# Patient Record
Sex: FEMALE | Race: WHITE | NOT HISPANIC OR LATINO | Employment: OTHER | URBAN - METROPOLITAN AREA
[De-identification: names, ages, dates, MRNs, and addresses within clinical notes are randomized per-mention and may not be internally consistent; named-entity substitution may affect disease eponyms.]

---

## 2023-10-20 PROBLEM — M62.81 MUSCLE WEAKNESS: Status: ACTIVE | Noted: 2023-10-20

## 2023-10-20 PROBLEM — K21.9 GASTROESOPHAGEAL REFLUX DISEASE WITHOUT ESOPHAGITIS: Status: ACTIVE | Noted: 2023-10-20

## 2023-10-20 PROBLEM — K64.9 HEMORRHOIDS: Status: ACTIVE | Noted: 2023-10-20

## 2023-10-20 PROBLEM — R04.2 BLOOD-TINGED SPUTUM: Status: ACTIVE | Noted: 2023-10-20

## 2023-10-20 PROBLEM — B96.89 BACTERIAL VAGINOSIS: Status: ACTIVE | Noted: 2023-10-20

## 2023-10-20 PROBLEM — N76.0 BACTERIAL VAGINOSIS: Status: ACTIVE | Noted: 2023-10-20

## 2023-10-20 PROBLEM — R30.0 DYSURIA: Status: ACTIVE | Noted: 2023-10-20

## 2023-10-20 PROBLEM — N30.01 ACUTE CYSTITIS WITH HEMATURIA: Status: ACTIVE | Noted: 2023-10-20

## 2023-10-20 PROBLEM — J20.9 ACUTE BRONCHITIS: Status: ACTIVE | Noted: 2023-10-20

## 2023-10-20 PROBLEM — R10.2 PELVIC PAIN: Status: ACTIVE | Noted: 2023-10-20

## 2023-10-20 PROBLEM — J06.9 VIRAL URI WITH COUGH: Status: ACTIVE | Noted: 2023-10-20

## 2023-10-20 PROBLEM — R53.83 FATIGUE: Status: ACTIVE | Noted: 2023-10-20

## 2023-10-20 PROBLEM — F12.10 CANNABIS ABUSE, UNCOMPLICATED: Status: ACTIVE | Noted: 2023-10-20

## 2023-10-20 PROBLEM — Z34.82 MULTIGRAVIDA IN SECOND TRIMESTER (HHS-HCC): Status: ACTIVE | Noted: 2023-10-20

## 2023-10-20 PROBLEM — N89.8 VAGINAL DISCHARGE: Status: ACTIVE | Noted: 2023-10-20

## 2023-10-20 RX ORDER — DULOXETIN HYDROCHLORIDE 30 MG/1
1 CAPSULE, DELAYED RELEASE ORAL NIGHTLY
COMMUNITY
Start: 2016-11-09 | End: 2023-10-23 | Stop reason: WASHOUT

## 2023-10-20 RX ORDER — VALACYCLOVIR HYDROCHLORIDE 1 G/1
TABLET, FILM COATED ORAL
COMMUNITY
End: 2024-04-11 | Stop reason: ALTCHOICE

## 2023-10-20 RX ORDER — ACETAMINOPHEN 500 MG
1000 TABLET ORAL EVERY 6 HOURS
COMMUNITY
Start: 2021-04-26 | End: 2023-10-23 | Stop reason: WASHOUT

## 2023-10-20 RX ORDER — FLUCONAZOLE 150 MG/1
TABLET ORAL
COMMUNITY
Start: 2022-05-13 | End: 2023-10-23 | Stop reason: WASHOUT

## 2023-10-20 RX ORDER — COPPER 313.4 MG/1
INTRAUTERINE DEVICE INTRAUTERINE
COMMUNITY

## 2023-10-20 RX ORDER — CETIRIZINE HYDROCHLORIDE 10 MG/1
TABLET ORAL DAILY
COMMUNITY
Start: 2014-01-21 | End: 2023-10-23 | Stop reason: WASHOUT

## 2023-10-20 RX ORDER — IBUPROFEN 600 MG/1
600 TABLET ORAL EVERY 6 HOURS PRN
COMMUNITY
Start: 2021-04-26 | End: 2023-10-23 | Stop reason: WASHOUT

## 2023-10-20 RX ORDER — VALACYCLOVIR HYDROCHLORIDE 500 MG/1
1 TABLET, FILM COATED ORAL DAILY
COMMUNITY
End: 2024-04-11 | Stop reason: ALTCHOICE

## 2023-10-23 ENCOUNTER — OFFICE VISIT (OUTPATIENT)
Dept: OBSTETRICS AND GYNECOLOGY | Facility: CLINIC | Age: 28
End: 2023-10-23
Payer: COMMERCIAL

## 2023-10-23 VITALS
SYSTOLIC BLOOD PRESSURE: 102 MMHG | HEIGHT: 63 IN | WEIGHT: 112 LBS | BODY MASS INDEX: 19.84 KG/M2 | DIASTOLIC BLOOD PRESSURE: 62 MMHG

## 2023-10-23 DIAGNOSIS — R10.2 PELVIC PAIN: Primary | ICD-10-CM

## 2023-10-23 DIAGNOSIS — N89.8 VAGINAL DISCHARGE: ICD-10-CM

## 2023-10-23 PROCEDURE — 87661 TRICHOMONAS VAGINALIS AMPLIF: CPT

## 2023-10-23 PROCEDURE — 1036F TOBACCO NON-USER: CPT | Performed by: NURSE PRACTITIONER

## 2023-10-23 PROCEDURE — 87205 SMEAR GRAM STAIN: CPT

## 2023-10-23 PROCEDURE — 99214 OFFICE O/P EST MOD 30 MIN: CPT | Performed by: NURSE PRACTITIONER

## 2023-10-23 PROCEDURE — 87491 CHLMYD TRACH DNA AMP PROBE: CPT

## 2023-10-23 PROCEDURE — 87591 N.GONORRHOEAE DNA AMP PROB: CPT

## 2023-10-23 ASSESSMENT — ENCOUNTER SYMPTOMS
CONSTITUTIONAL NEGATIVE: 1
RESPIRATORY NEGATIVE: 1
GASTROINTESTINAL NEGATIVE: 1
PSYCHIATRIC NEGATIVE: 1
NEUROLOGICAL NEGATIVE: 1

## 2023-10-23 NOTE — PROGRESS NOTES
"Subjective   Patient ID: Kristina Manning is a 28 y.o. female who presents for Vaginal Bleeding (Patient complains of \"intense cramping\" outside of her periods, lower back pain and bloody, mucus discharge./Reviewing EMR indicates normal PAP 6/9/21. Paragard was inserted 7/13/2021).  28 year old here for complaints of having pelvic pain and bloody vaginal discharge.  She first had pelvic pain start months ago.  She notes that the pain is off and on.  The pain can be daily and sometimes it will last a few minutes and other times it will last hours.  The pain is not associated with her period, but feels like period cramps.  She notes that she thought this may be related to dehydration for a while and she ignored it, but then this month she started to have bloody vaginal discharge and became concerned.  The vaginal discharge is off and on.  She denies any urinary changes with the discharge.  She does desire std testing.     Vaginal Bleeding  The patient's primary symptoms include pelvic pain and vaginal discharge.       Review of Systems   Constitutional: Negative.    HENT: Negative.     Respiratory: Negative.     Gastrointestinal: Negative.    Genitourinary:  Positive for pelvic pain, vaginal bleeding and vaginal discharge.   Skin: Negative.    Neurological: Negative.    Psychiatric/Behavioral: Negative.         Objective   Physical Exam  Vitals reviewed.   Constitutional:       Appearance: Normal appearance. She is well-developed.   Pulmonary:      Effort: Pulmonary effort is normal. No respiratory distress.   Chest:   Breasts:     Breasts are symmetrical.      Right: Normal. No swelling, bleeding, inverted nipple, mass, nipple discharge, skin change or tenderness.      Left: Normal. No swelling, bleeding, inverted nipple, mass, nipple discharge, skin change or tenderness.   Abdominal:      Palpations: Abdomen is soft.   Genitourinary:     General: Normal vulva.      Exam position: Lithotomy position.      Pubic " Area: No rash.       Labia:         Right: No rash, tenderness, lesion or injury.         Left: No rash, tenderness, lesion or injury.       Urethra: No prolapse, urethral pain, urethral swelling or urethral lesion.      Vagina: Vaginal discharge present.      Cervix: Normal.      Uterus: Normal. Tender.       Adnexa: Right adnexa normal and left adnexa normal.      Rectum: Normal.   Musculoskeletal:         General: Normal range of motion.   Lymphadenopathy:      Upper Body:      Right upper body: No supraclavicular, axillary or pectoral adenopathy.      Left upper body: No supraclavicular, axillary or pectoral adenopathy.   Skin:     General: Skin is warm and dry.   Neurological:      General: No focal deficit present.      Mental Status: She is alert and oriented to person, place, and time. Mental status is at baseline.   Psychiatric:         Attention and Perception: Attention and perception normal.         Mood and Affect: Mood and affect normal.         Speech: Speech normal.         Behavior: Behavior normal. Behavior is cooperative.         Thought Content: Thought content normal.         Judgment: Judgment normal.         Assessment/Plan   Problem List Items Addressed This Visit             ICD-10-CM    Pelvic pain - Primary R10.2    Relevant Orders    US PELVIS TRANSABDOMINAL WITH TRANSVAGINAL    Vaginal discharge N89.8     Gram stain sent  Gc/chlamydia/trich sent  Will treat pending results if needed  If normal can refer to GI for the pain  Follow up as needed

## 2023-10-24 LAB
C TRACH RRNA SPEC QL NAA+PROBE: NEGATIVE
CLUE CELLS VAG LPF-#/AREA: NORMAL /[LPF]
N GONORRHOEA DNA SPEC QL PROBE+SIG AMP: NEGATIVE
NUGENT SCORE: 1
T VAGINALIS RRNA SPEC QL NAA+PROBE: NEGATIVE
YEAST VAG WET PREP-#/AREA: NORMAL

## 2023-10-27 ENCOUNTER — ANCILLARY PROCEDURE (OUTPATIENT)
Dept: RADIOLOGY | Facility: CLINIC | Age: 28
End: 2023-10-27
Payer: COMMERCIAL

## 2023-10-27 DIAGNOSIS — R10.2 PELVIC PAIN: ICD-10-CM

## 2023-10-27 PROCEDURE — 76830 TRANSVAGINAL US NON-OB: CPT | Performed by: RADIOLOGY

## 2023-10-27 PROCEDURE — 76856 US EXAM PELVIC COMPLETE: CPT | Performed by: RADIOLOGY

## 2023-10-27 PROCEDURE — 76856 US EXAM PELVIC COMPLETE: CPT

## 2023-10-31 ENCOUNTER — TELEPHONE (OUTPATIENT)
Dept: OBSTETRICS AND GYNECOLOGY | Facility: CLINIC | Age: 28
End: 2023-10-31
Payer: COMMERCIAL

## 2023-10-31 ENCOUNTER — HOSPITAL ENCOUNTER (EMERGENCY)
Facility: HOSPITAL | Age: 28
Discharge: HOME | End: 2023-10-31
Attending: EMERGENCY MEDICINE
Payer: COMMERCIAL

## 2023-10-31 ENCOUNTER — ANCILLARY PROCEDURE (OUTPATIENT)
Dept: RADIOLOGY | Facility: HOSPITAL | Age: 28
End: 2023-10-31
Payer: COMMERCIAL

## 2023-10-31 VITALS
HEIGHT: 63 IN | SYSTOLIC BLOOD PRESSURE: 113 MMHG | DIASTOLIC BLOOD PRESSURE: 76 MMHG | HEART RATE: 78 BPM | BODY MASS INDEX: 19.49 KG/M2 | WEIGHT: 110 LBS | OXYGEN SATURATION: 99 % | TEMPERATURE: 97.9 F | RESPIRATION RATE: 18 BRPM

## 2023-10-31 DIAGNOSIS — G62.9 NEUROPATHY: Primary | ICD-10-CM

## 2023-10-31 DIAGNOSIS — E87.6 HYPOKALEMIA: ICD-10-CM

## 2023-10-31 LAB
ALBUMIN SERPL BCP-MCNC: 4.5 G/DL (ref 3.4–5)
ALP SERPL-CCNC: 41 U/L (ref 33–110)
ALT SERPL W P-5'-P-CCNC: 13 U/L (ref 7–45)
ANION GAP SERPL CALC-SCNC: 12 MMOL/L (ref 10–20)
AST SERPL W P-5'-P-CCNC: 17 U/L (ref 9–39)
BASOPHILS # BLD AUTO: 0.03 X10*3/UL (ref 0–0.1)
BASOPHILS NFR BLD AUTO: 0.5 %
BILIRUB SERPL-MCNC: 0.4 MG/DL (ref 0–1.2)
BUN SERPL-MCNC: 13 MG/DL (ref 6–23)
CALCIUM SERPL-MCNC: 9.6 MG/DL (ref 8.6–10.3)
CHLORIDE SERPL-SCNC: 103 MMOL/L (ref 98–107)
CO2 SERPL-SCNC: 25 MMOL/L (ref 21–32)
CREAT SERPL-MCNC: 0.62 MG/DL (ref 0.5–1.05)
EOSINOPHIL # BLD AUTO: 0.04 X10*3/UL (ref 0–0.7)
EOSINOPHIL NFR BLD AUTO: 0.6 %
ERYTHROCYTE [DISTWIDTH] IN BLOOD BY AUTOMATED COUNT: 12.1 % (ref 11.5–14.5)
GFR SERPL CREATININE-BSD FRML MDRD: >90 ML/MIN/1.73M*2
GLUCOSE SERPL-MCNC: 96 MG/DL (ref 74–99)
HCT VFR BLD AUTO: 36.7 % (ref 36–46)
HGB BLD-MCNC: 11.9 G/DL (ref 12–16)
IMM GRANULOCYTES # BLD AUTO: 0.01 X10*3/UL (ref 0–0.7)
IMM GRANULOCYTES NFR BLD AUTO: 0.2 % (ref 0–0.9)
LYMPHOCYTES # BLD AUTO: 2.69 X10*3/UL (ref 1.2–4.8)
LYMPHOCYTES NFR BLD AUTO: 41.1 %
MCH RBC QN AUTO: 29.5 PG (ref 26–34)
MCHC RBC AUTO-ENTMCNC: 32.4 G/DL (ref 32–36)
MCV RBC AUTO: 91 FL (ref 80–100)
MONOCYTES # BLD AUTO: 0.34 X10*3/UL (ref 0.1–1)
MONOCYTES NFR BLD AUTO: 5.2 %
NEUTROPHILS # BLD AUTO: 3.44 X10*3/UL (ref 1.2–7.7)
NEUTROPHILS NFR BLD AUTO: 52.4 %
NRBC BLD-RTO: 0 /100 WBCS (ref 0–0)
PLATELET # BLD AUTO: 311 X10*3/UL (ref 150–450)
PMV BLD AUTO: 9.4 FL (ref 7.5–11.5)
POTASSIUM SERPL-SCNC: 3.3 MMOL/L (ref 3.5–5.3)
PROT SERPL-MCNC: 7.9 G/DL (ref 6.4–8.2)
RBC # BLD AUTO: 4.03 X10*6/UL (ref 4–5.2)
SODIUM SERPL-SCNC: 137 MMOL/L (ref 136–145)
WBC # BLD AUTO: 6.6 X10*3/UL (ref 4.4–11.3)

## 2023-10-31 PROCEDURE — 93971 EXTREMITY STUDY: CPT | Mod: FR

## 2023-10-31 PROCEDURE — 93971 EXTREMITY STUDY: CPT | Mod: FOREIGN READ | Performed by: RADIOLOGY

## 2023-10-31 PROCEDURE — 99284 EMERGENCY DEPT VISIT MOD MDM: CPT | Mod: 25

## 2023-10-31 PROCEDURE — 85025 COMPLETE CBC W/AUTO DIFF WBC: CPT | Performed by: EMERGENCY MEDICINE

## 2023-10-31 PROCEDURE — 2500000004 HC RX 250 GENERAL PHARMACY W/ HCPCS (ALT 636 FOR OP/ED): Performed by: EMERGENCY MEDICINE

## 2023-10-31 PROCEDURE — 80053 COMPREHEN METABOLIC PANEL: CPT | Performed by: EMERGENCY MEDICINE

## 2023-10-31 PROCEDURE — 99283 EMERGENCY DEPT VISIT LOW MDM: CPT | Mod: 25 | Performed by: EMERGENCY MEDICINE

## 2023-10-31 PROCEDURE — 36415 COLL VENOUS BLD VENIPUNCTURE: CPT | Performed by: EMERGENCY MEDICINE

## 2023-10-31 RX ORDER — POTASSIUM CHLORIDE 20 MEQ/1
40 TABLET, EXTENDED RELEASE ORAL ONCE
Status: COMPLETED | OUTPATIENT
Start: 2023-10-31 | End: 2023-10-31

## 2023-10-31 RX ADMIN — POTASSIUM CHLORIDE 40 MEQ: 1500 TABLET, EXTENDED RELEASE ORAL at 23:18

## 2023-10-31 ASSESSMENT — PAIN DESCRIPTION - FREQUENCY: FREQUENCY: CONSTANT/CONTINUOUS

## 2023-10-31 ASSESSMENT — COLUMBIA-SUICIDE SEVERITY RATING SCALE - C-SSRS
6. HAVE YOU EVER DONE ANYTHING, STARTED TO DO ANYTHING, OR PREPARED TO DO ANYTHING TO END YOUR LIFE?: NO
1. IN THE PAST MONTH, HAVE YOU WISHED YOU WERE DEAD OR WISHED YOU COULD GO TO SLEEP AND NOT WAKE UP?: NO
2. HAVE YOU ACTUALLY HAD ANY THOUGHTS OF KILLING YOURSELF?: NO

## 2023-10-31 ASSESSMENT — PAIN DESCRIPTION - ORIENTATION: ORIENTATION: RIGHT

## 2023-10-31 ASSESSMENT — PAIN DESCRIPTION - LOCATION: LOCATION: LEG

## 2023-10-31 ASSESSMENT — PAIN DESCRIPTION - DESCRIPTORS
DESCRIPTORS: STABBING
DESCRIPTORS: BURNING

## 2023-10-31 ASSESSMENT — PAIN - FUNCTIONAL ASSESSMENT: PAIN_FUNCTIONAL_ASSESSMENT: 0-10

## 2023-10-31 ASSESSMENT — PAIN SCALES - GENERAL: PAINLEVEL_OUTOF10: 6

## 2023-10-31 NOTE — TELEPHONE ENCOUNTER
----- Message from MALCOLM Morgan sent at 10/30/2023  2:56 PM EDT -----  Please inform patient that her ultrasound returned showing her right ovarian cyst which normally resolves in 1-2 cycles.

## 2023-11-01 NOTE — ED PROVIDER NOTES
HPI   Chief Complaint   Patient presents with    Leg Pain     Right quad pain started behind the right knee,  Now has a stabbing, burning pain traveling up the leg.  Foot is cool and slow cap refill,n  Started paty 1800.         Patient started having significant pain behind her right leg.  Started just above the knee and moved upward.  Sharp burning type pain.  She denies recent trauma.  Started around 6 PM and has been continuous.  She does not take any pain medicine for it.  She denies any shortness of breath any fever chills cough or cold symptoms.  Never had this before.  She denies possibility of pregnancy.  She says it feels little different than a muscle pain.  She says nothing seems to make it feel better or worse that she is there.  No pain in her back.                          [place] Coma Scale Score: 15                  Patient History   Past Medical History:   Diagnosis Date    Acute pharyngitis, unspecified 2017    Acute viral pharyngitis    Encounter for  screening, unspecified 2021     screening encounter    Encounter for removal of intrauterine contraceptive device 2020    Encounter for removal of intrauterine contraceptive device (IUD)    Encounter for supervision of other normal pregnancy, first trimester 2020    Multigravida in first trimester    Encounter for supervision of other normal pregnancy, second trimester 2020    Multigravida in second trimester    Encounter for supervision of other normal pregnancy, third trimester 2020    Multigravida in third trimester    Maternal care for other known or suspected poor fetal growth, unspecified trimester, fetus 1 2021    SGA (small for gestational age), fetal, affecting care of mother, antepartum, unspecified trimester, fetus 1    Migraine without aura, not intractable, without status migrainosus 2017    Common migraine without aura    Other conditions influencing health status  2022    History of cough    Other infections with a predominantly sexual mode of transmission complicating pregnancy, unspecified trimester 2020    Genital herpes complicating pregnancy    Other specified pregnancy related conditions, second trimester 2020    Back pain during pregnancy in second trimester    Other specified pregnancy related conditions, unspecified trimester 2020    Pelvic pain in antepartum period    Other specified pregnancy related conditions, unspecified trimester 2021    Rh negative, antepartum    Other viral diseases complicating pregnancy, unspecified trimester 2021    COVID-19 affecting pregnancy, antepartum    Personal history of other diseases of the female genital tract 2020    History of amenorrhea    Personal history of other diseases of the musculoskeletal system and connective tissue     History of fibromyalgia    Personal history of other diseases of the respiratory system     History of asthma    Pregnancy with inconclusive fetal viability, not applicable or unspecified 2020    Pregnancy with inconclusive fetal viability    Supervision of pregnancy with other poor reproductive or obstetric history, first trimester 10/01/2020    Current pregnancy in first trimester with history of spontaneous  during prior pregnancy    Supervision of pregnancy with other poor reproductive or obstetric history, second trimester 2021    Current pregnancy in second trimester with history of spontaneous  during prior pregnancy    Supervision of pregnancy with other poor reproductive or obstetric history, third trimester 2021    Current pregnancy in third trimester with history of spontaneous  during prior pregnancy    Unspecified convulsions (CMS/HCC) 2017    Generalized convulsive seizure     History reviewed. No pertinent surgical history.  Family History   Problem Relation Name Age of Onset    Hypertension Mother           Benign    Other (Cardiac Disorder) Mother      Other (Malignant Neoplasm) Mother      Other (Malignant Neoplasm) Other Grandmother     Hypertension Other Grandfather     Other (Cardiac Disorder) Other Grandfather      Social History     Tobacco Use    Smoking status: Never    Smokeless tobacco: Never   Vaping Use    Vaping Use: Never used   Substance Use Topics    Alcohol use: Yes    Drug use: Not Currently     Types: Marijuana       Physical Exam   ED Triage Vitals [10/31/23 2107]   Temp Heart Rate Resp BP   36.6 °C (97.9 °F) 80 20 111/74      SpO2 Temp Source Heart Rate Source Patient Position   100 % Skin Monitor Sitting      BP Location FiO2 (%)     Left arm --       Physical Exam  Vitals reviewed.   Constitutional:       General: She is awake.      Appearance: Normal appearance.   HENT:      Head: Normocephalic.      Nose: Nose normal.   Cardiovascular:      Rate and Rhythm: Normal rate and regular rhythm.   Pulmonary:      Effort: Pulmonary effort is normal.      Breath sounds: Normal breath sounds.   Abdominal:      Palpations: Abdomen is soft.   Musculoskeletal:      Cervical back: Normal range of motion.      Comments: Most the patient's pain is between the right knee and hip posteriorly. No calf tenderness. No cords good ROM.   Skin:     General: Skin is warm.      Capillary Refill: Capillary refill takes less than 2 seconds.   Neurological:      Mental Status: She is alert.      Comments: Decreased sensation on right leg per pt. Nl strength foot and toes.          ED Course & MDM   ED Course as of 10/31/23 2337   Tue Oct 31, 2023   2133 [facility] differential diagnosis with the patient.  Concern for DVT.  No sign of obvious deformity or erythema.  Doubt infectious process.  I offered patient some pain control and she decided she want to hold off for now.  Plan is to work-up with ultrasound and labs. [RZ]   2219 I reexamined the patient 2219 patient is stable still refuses pain medication.  Clinically  I suspect this is neurologic in nature.  Awaiting results. [RZ]   1794 I reviewed the results with the patient ultrasound shows no clots potassium slightly low patient will have potassium repletion and will be discharged home. [RZ]      ED Course User Index  [RZ] Haim Lance MD         Diagnoses as of 10/31/23 2337   Neuropathy   Hypokalemia       Medical Decision Making      Procedure  Procedures     Haim Lance MD  10/31/23 0559       Haim Lance MD  10/31/23 5308

## 2024-01-19 ENCOUNTER — HOSPITAL ENCOUNTER (EMERGENCY)
Facility: HOSPITAL | Age: 29
Discharge: HOME | End: 2024-01-19
Attending: EMERGENCY MEDICINE
Payer: COMMERCIAL

## 2024-01-19 VITALS
WEIGHT: 115 LBS | OXYGEN SATURATION: 97 % | TEMPERATURE: 98.3 F | DIASTOLIC BLOOD PRESSURE: 73 MMHG | HEIGHT: 63 IN | RESPIRATION RATE: 16 BRPM | HEART RATE: 85 BPM | SYSTOLIC BLOOD PRESSURE: 109 MMHG | BODY MASS INDEX: 20.38 KG/M2

## 2024-01-19 DIAGNOSIS — R20.0 PERIORAL NUMBNESS: Primary | ICD-10-CM

## 2024-01-19 DIAGNOSIS — I88.9 SUBMENTAL LYMPHADENITIS: ICD-10-CM

## 2024-01-19 PROCEDURE — 99283 EMERGENCY DEPT VISIT LOW MDM: CPT | Performed by: EMERGENCY MEDICINE

## 2024-01-19 ASSESSMENT — COLUMBIA-SUICIDE SEVERITY RATING SCALE - C-SSRS
1. IN THE PAST MONTH, HAVE YOU WISHED YOU WERE DEAD OR WISHED YOU COULD GO TO SLEEP AND NOT WAKE UP?: NO
6. HAVE YOU EVER DONE ANYTHING, STARTED TO DO ANYTHING, OR PREPARED TO DO ANYTHING TO END YOUR LIFE?: NO
2. HAVE YOU ACTUALLY HAD ANY THOUGHTS OF KILLING YOURSELF?: NO

## 2024-01-19 ASSESSMENT — PAIN SCALES - GENERAL: PAINLEVEL_OUTOF10: 0 - NO PAIN

## 2024-01-19 ASSESSMENT — LIFESTYLE VARIABLES
EVER FELT BAD OR GUILTY ABOUT YOUR DRINKING: NO
HAVE YOU EVER FELT YOU SHOULD CUT DOWN ON YOUR DRINKING: NO
HAVE PEOPLE ANNOYED YOU BY CRITICIZING YOUR DRINKING: NO
EVER HAD A DRINK FIRST THING IN THE MORNING TO STEADY YOUR NERVES TO GET RID OF A HANGOVER: NO
REASON UNABLE TO ASSESS: NO

## 2024-01-19 ASSESSMENT — PAIN - FUNCTIONAL ASSESSMENT: PAIN_FUNCTIONAL_ASSESSMENT: 0-10

## 2024-01-19 NOTE — ED TRIAGE NOTES
Pt presents for all over body numbness. States that she was getting a shoulder massage from her nephew. She states that when she stood up, she felt a ball under her chin that progressed to swelling. She states that since then she has had numbness over her entire body. Pt alert and oriented x's 4. Ambulatory in no acute distress at this time.

## 2024-01-19 NOTE — ED PROVIDER NOTES
HPI   Chief Complaint   Patient presents with    Numbness     All over body       HPI: []  28-year-old female history fibromyalgia comes in with the perioral numbness and pain under the chin.  She states yesterday she was getting a massage in the right parascapular area by her 8-year-old nephew and she developed numbness around her lips and pain under the chin.  She had no numbness of the arms or legs.  She has some tingling of the face.  No headache or vision changes.  No chest pain pressure heaviness cough congestion trouble breathing syncope or near syncope.  She had no neck manipulation done.  Nothing in the C-spine.  The massage close in the right parascapular and right shoulder area nothing close to C-spine.    Past history: Fibromyalgia  Social: Patient denies current tobacco alcohol drug abuse.  REVIEW OF SYSTEMS:    GENERAL.: No weight loss, fatigue, anorexia, insomnia, fever.    EYES: No vision loss, double vision, drainage, eye pain.    ENT: No pharyngitis, dry mouth.  Positive pain under the chin    CARDIOPULMONARY: No chest pain, palpitations, syncope, near syncope. No shortness of breath, cough, hemoptysis.    GI: No abdominal pain, change in bowel habits, melena, hematemesis, hematochezia, nausea, vomiting, diarrhea.    : No discharge, dysuria, frequency, urgency, hematuria.  Neuro: Positive perioral numbness  MS: No limb pain, joint pain, joint swelling.    SKIN: No rashes.    PSYCH: No depression, anxiety, suicidality, homicidality.    Review of systems is otherwise negative unless stated above or in history of present illness.  Social history, family history, allergies reviewed.  PHYSICAL EXAM:    GENERAL: Vitals noted, no distress. Alert and oriented  x 3. Non-toxic.      EENT: TMs clear. Posterior oropharynx unremarkable. No meningismus. No LAD.  Patient does have very tiny up to 2 mm size multiple tender submental lymph nodes    NECK: Supple. Nontender. No midline tenderness.     CARDIAC:  Regular, rate, rhythm. No murmurs rubs or gallops. No JVD    PULMONARY: Lungs clear bilaterally with good aeration. No wheezes rales or rhonchi. No respiratory distress.  No tachypnea stridor or retractions    ABDOMEN: Soft, nonsurgical. Nontender. No peritoneal signs. Normoactive bowel sounds. No pulsatile masses.  Negative CVA tenderness    EXTREMITIES: No peripheral edema. Negative Homans bilaterally, no cords.  2+ bounding possible perfused    SKIN: No rash. Intact.     NEURO: No focal neurologic deficits, NIH score of 0. Cranial nerves normal as tested from II through XII.   Musculoskeletal: Patient Novolinge C, T, L-spine tenderness.  Pelvis stable  MEDICAL DECISION MAKING:  ED course: Patient remains asymptomatic exam is normal neuroexam normal NIH stroke scale 0.    Impression: #1 submentally pharyngitis, #2 nonspecific perioral paresthesia    Plans and MDM: 28-year-old female comes in with 1 episode of perioral numbness and tingling in the face which is since resolved currently has a normal examination with an NIH stroke scale of 0, no suspicion for stroke TIA carotid dissection or vertebral artery dissection, does have some tender submental lymphadenopathy nonspecific no obvious dental abscess, patient reassured will be discharged with supportive care advised.  Can follow with primary doctor with strict return precaution.                          [place] Coma Scale Score: 15                  Patient History   Past Medical History:   Diagnosis Date    Acute pharyngitis, unspecified 2017    Acute viral pharyngitis    Encounter for  screening, unspecified 2021     screening encounter    Encounter for removal of intrauterine contraceptive device 2020    Encounter for removal of intrauterine contraceptive device (IUD)    Encounter for supervision of other normal pregnancy, first trimester 2020    Multigravida in first trimester    Encounter for supervision of other  normal pregnancy, second trimester 2020    Multigravida in second trimester    Encounter for supervision of other normal pregnancy, third trimester 2020    Multigravida in third trimester    Maternal care for other known or suspected poor fetal growth, unspecified trimester, fetus 1 2021    SGA (small for gestational age), fetal, affecting care of mother, antepartum, unspecified trimester, fetus 1    Migraine without aura, not intractable, without status migrainosus 2017    Common migraine without aura    Other conditions influencing health status 2022    History of cough    Other infections with a predominantly sexual mode of transmission complicating pregnancy, unspecified trimester 2020    Genital herpes complicating pregnancy    Other specified pregnancy related conditions, second trimester 2020    Back pain during pregnancy in second trimester    Other specified pregnancy related conditions, unspecified trimester 2020    Pelvic pain in antepartum period    Other specified pregnancy related conditions, unspecified trimester 2021    Rh negative, antepartum    Other viral diseases complicating pregnancy, unspecified trimester 2021    COVID-19 affecting pregnancy, antepartum    Personal history of other diseases of the female genital tract 2020    History of amenorrhea    Personal history of other diseases of the musculoskeletal system and connective tissue     History of fibromyalgia    Personal history of other diseases of the respiratory system     History of asthma    Pregnancy with inconclusive fetal viability, not applicable or unspecified 2020    Pregnancy with inconclusive fetal viability    Supervision of pregnancy with other poor reproductive or obstetric history, first trimester 10/01/2020    Current pregnancy in first trimester with history of spontaneous  during prior pregnancy    Supervision of pregnancy with other poor  reproductive or obstetric history, second trimester 2021    Current pregnancy in second trimester with history of spontaneous  during prior pregnancy    Supervision of pregnancy with other poor reproductive or obstetric history, third trimester 2021    Current pregnancy in third trimester with history of spontaneous  during prior pregnancy    Unspecified convulsions (CMS/HCC) 2017    Generalized convulsive seizure     No past surgical history on file.  Family History   Problem Relation Name Age of Onset    Hypertension Mother          Benign    Other (Cardiac Disorder) Mother      Other (Malignant Neoplasm) Mother      Other (Malignant Neoplasm) Other Grandmother     Hypertension Other Grandfather     Other (Cardiac Disorder) Other Grandfather      Social History     Tobacco Use    Smoking status: Never    Smokeless tobacco: Never   Vaping Use    Vaping Use: Never used   Substance Use Topics    Alcohol use: Yes    Drug use: Not Currently     Types: Marijuana       Physical Exam   ED Triage Vitals [24 0128]   Temp Heart Rate Resp BP   36.8 °C (98.3 °F) 85 16 109/73      SpO2 Temp Source Heart Rate Source Patient Position   97 % Skin -- --      BP Location FiO2 (%)     -- --       Physical Exam    ED Course & MDM   ED Course as of 24 0410      0402 Patient neuroexam is normal her NIH stroke scale is 0, she has no midline C, T, L-spine tenderness.  She does have some tender submental lymph nodes about 3 mm each otherwise unremarkable lamination.  No suspicion for stroke TIA carotid dissection pneumothorax pneumomediastinum, will be discharged home with supportive care advised close outpatient follow-up with primary care physician with strict return precautions. [MT]      ED Course User Index  [MT] Marquez Gutierrez MD         Diagnoses as of 24 0410   Perioral numbness   Submental lymphadenitis       Medical Decision Making      Procedure  Procedures      Marquez Gutierrez MD  01/19/24 0411

## 2024-02-13 ENCOUNTER — OFFICE VISIT (OUTPATIENT)
Dept: OBSTETRICS AND GYNECOLOGY | Facility: CLINIC | Age: 29
End: 2024-02-13
Payer: COMMERCIAL

## 2024-02-13 ENCOUNTER — LAB (OUTPATIENT)
Dept: LAB | Facility: LAB | Age: 29
End: 2024-02-13
Payer: COMMERCIAL

## 2024-02-13 VITALS
DIASTOLIC BLOOD PRESSURE: 60 MMHG | SYSTOLIC BLOOD PRESSURE: 100 MMHG | BODY MASS INDEX: 21.23 KG/M2 | WEIGHT: 119.84 LBS

## 2024-02-13 DIAGNOSIS — Z01.419 ENCOUNTER FOR WELL WOMAN EXAM WITH ROUTINE GYNECOLOGICAL EXAM: Primary | ICD-10-CM

## 2024-02-13 DIAGNOSIS — F32.81 PMDD (PREMENSTRUAL DYSPHORIC DISORDER): ICD-10-CM

## 2024-02-13 DIAGNOSIS — Z11.51 SCREENING FOR HUMAN PAPILLOMAVIRUS (HPV): ICD-10-CM

## 2024-02-13 DIAGNOSIS — R53.83 FATIGUE, UNSPECIFIED TYPE: ICD-10-CM

## 2024-02-13 DIAGNOSIS — Z12.4 SCREENING FOR MALIGNANT NEOPLASM OF CERVIX: ICD-10-CM

## 2024-02-13 DIAGNOSIS — Z11.3 SCREEN FOR STD (SEXUALLY TRANSMITTED DISEASE): ICD-10-CM

## 2024-02-13 LAB — TSH SERPL-ACNC: 2.24 MIU/L (ref 0.44–3.98)

## 2024-02-13 PROCEDURE — 88141 CYTOPATH C/V INTERPRET: CPT | Performed by: STUDENT IN AN ORGANIZED HEALTH CARE EDUCATION/TRAINING PROGRAM

## 2024-02-13 PROCEDURE — 36415 COLL VENOUS BLD VENIPUNCTURE: CPT

## 2024-02-13 PROCEDURE — 1036F TOBACCO NON-USER: CPT | Performed by: NURSE PRACTITIONER

## 2024-02-13 PROCEDURE — 99395 PREV VISIT EST AGE 18-39: CPT | Performed by: NURSE PRACTITIONER

## 2024-02-13 PROCEDURE — 88175 CYTOPATH C/V AUTO FLUID REDO: CPT

## 2024-02-13 PROCEDURE — 84443 ASSAY THYROID STIM HORMONE: CPT

## 2024-02-13 PROCEDURE — 87800 DETECT AGNT MULT DNA DIREC: CPT

## 2024-02-13 PROCEDURE — 87661 TRICHOMONAS VAGINALIS AMPLIF: CPT

## 2024-02-13 RX ORDER — FLUOXETINE 10 MG/1
10 CAPSULE ORAL DAILY
Qty: 30 CAPSULE | Refills: 11 | Status: SHIPPED | OUTPATIENT
Start: 2024-02-13 | End: 2025-02-12

## 2024-02-13 NOTE — PROGRESS NOTES
HPI:   Kristina Manning is a 28 y.o. who presents today for her annual gynecologic exam with complaints    She has the following concerns; has been having increased cramping during the month. Has been having some spotting in between periods. She has a Paragard for pregnancy prevention.   Additionally she is having worsening PMS symptoms for which, she would like to try medication. She feels her symptoms of mood change prior to the start of her cycle are affecting her QOL.     Recent ultrasound showed:   Narrative & Impression   Interpreted By:  Mir Angeles,   STUDY:  US PELVIS TRANSABDOMINAL WITH TRANSVAGINAL;  10/27/2023 9:15 am      INDICATION:  Signs/Symptoms:pelvic pain.      COMPARISON:  None.      ACCESSION NUMBER(S):  [id number]      ORDERING CLINICIAN:  DOMINIC MOISE      TECHNIQUE:  Multiple multiplanar static gray scale, color and spectral waveform  sonographic images of the pelvis were obtained.  Transabdominal and  endovaginal ultrasound was performed.      FINDINGS:  UTERUS:  The uterus measures  8.6 x 5.0 x 6.3 cm in sagittal, AP, and  transverse dimensions.  No discrete myometrial mass identified on the  provided images.      ENDOMETRIUM:  The central endometrial complex measures  16 mm in dual layer  thickness.  IUD is present within the endometrial cavity.      RIGHT ADNEXA:  The right ovary measures  3.9 x 2.7 x 2.4 cm and demonstrates flow on  Doppler imaging.  2.8 cm mildly complex cystic structure, most likely  representing a hemorrhagic cyst.      LEFT ADNEXA:  The left ovary measures  3.1 x 2.0 x 2.6 cm and demonstrates flow on  Doppler imaging.  It appears sonographically unremarkable.      CUL DE SAC:  No significant free fluid.      IMPRESSION:  2.8 cm complex cystic structure within the right ovary most likely  representing a hemorrhagic cyst. IUD in place.     GYN HISTORY:  Periods are irregular, lasting 10 days.   Dysmenorrhea:mild, occurring throughout menses. Cyclic symptoms  include depression and pelvic pain.   No intermenstrual bleeding, spotting, or discharge.    Current contraception: IUD      Requests STD testing: yes -        PAP History   Last pap:   2021 Normal HPV Not done  History of abnormal pap: yes - Hx of LSIL in  2020. PAP was NILM in 2021.     Health Screening  Family history of breast, uterine, ovarian or colon cancer: no           The patient feels safe at home.         Review of Systems:   Constitutional: no fever and no chills.  Cardiovascular: no chest pain.   Respiratory: no shortness of breath.   Gastrointestinal: no nausea, no abdominal pain and no constipation  Genitourinary: no dysuria, no urinary incontinence, no vaginal dryness, no pelvic pain and no vaginal discharge.   Neurological: no headache.  Psychiatric: no anxiety and no depression.              Objective         /60   Wt 54.4 kg (119 lb 13.5 oz)   LMP 02/11/2024 (Exact Date)   BMI 21.23 kg/m²         Physical Exam:   Constitutional: Alert and in no acute distress. Well developed, well nourished.      Neck: No neck asymmetry. Supple. Thyroid not enlarged and there were no palpable thyroid nodules.      Cardiovascular: Heart rate and rhythm were normal, normal S1 and S2, no gallops, and no murmurs.      Pulmonary: No respiratory distress. Clear bilateral breath sounds.      Chest: Breasts: Normal appearance, no nipple discharge and no skin changes. Palpation of breasts and axillae: No palpable mass and no axillary lymphadenopathy.      Abdomen: Soft nontender; no abdominal mass palpated. Normal bowel sounds. No organomegaly.      Genitourinary:   - External genitalia: Normal.   - Palpation of lymph nodes in groin: No inguinal lymphadenopathy.   - Bartholin's Urethral and Skenes Glands: Normal.   - Urethra: Normal.    -Bladder: Normal on palpation.   - Vagina: Normal.   - Cervix: Normal. + IUD strings noted at cervical os.   - Uterus: Normal. Right Adnexa/parametria: Normal. Left  Adnexa/parametria: Normal.   - Perianal Area: Normal.      Skin: Normal skin color and pigmentation, normal skin turgor, and no rash     Psychiatric: Alert and oriented x 3. Affect normal to patient baseline. Mood: Appropriate.            Assessment/Plan       Diagnoses and all orders for this visit:  Encounter for well woman exam with routine gynecological exam  Here for well woman exam and to discuss her periods. Cycles are frequent with Paragard; she is also having worsening PMS symptoms .  PMDD (premenstrual dysphoric disorder)  -     FLUoxetine (PROzac) 10 mg capsule; Take 1 capsule (10 mg) by mouth once daily.  Risks and benefits of this medication discussed including risk of suicidal ideation. If patient experiences this symptoms; she was advised to discontinue the medication and seek care if needed.   988 crisis line discussed  Fatigue, unspecified type  -     TSH with reflex to Free T4 if abnormal; Future  Screening for malignant neoplasm of cervix  -     THINPREP PAP TEST  -     C. Trachomatis / N. Gonorrhoeae, Amplified Detection  -     Trichomonas vaginalis, Nucleic Acid Detection  Screening for human papillomavirus (HPV)  -     THINPREP PAP TEST  -     C. Trachomatis / N. Gonorrhoeae, Amplified Detection  -     Trichomonas vaginalis, Nucleic Acid Detection  Screen for STD (sexually transmitted disease)  -     THINPREP PAP TEST  -     C. Trachomatis / N. Gonorrhoeae, Amplified Detection  -     Trichomonas vaginalis, Nucleic Acid Detection  Follow-up in 4-6 weeks to reevaluate medication.       SHANTI You-CNP

## 2024-02-14 LAB
C TRACH RRNA SPEC QL NAA+PROBE: NEGATIVE
N GONORRHOEA DNA SPEC QL PROBE+SIG AMP: NEGATIVE
T VAGINALIS RRNA SPEC QL NAA+PROBE: NEGATIVE

## 2024-03-01 LAB
CYTOLOGY CMNT CVX/VAG CYTO-IMP: NORMAL
LAB AP HPV GENOTYPE QUESTION: YES
LAB AP HPV HR: NORMAL
LAB AP PAP ADDITIONAL TESTS: NORMAL
LABORATORY COMMENT REPORT: NORMAL
LMP START DATE: NORMAL
PATH REPORT.TOTAL CANCER: NORMAL

## 2024-03-07 ENCOUNTER — OFFICE VISIT (OUTPATIENT)
Dept: OBSTETRICS AND GYNECOLOGY | Facility: CLINIC | Age: 29
End: 2024-03-07
Payer: COMMERCIAL

## 2024-03-07 ENCOUNTER — APPOINTMENT (OUTPATIENT)
Dept: OBSTETRICS AND GYNECOLOGY | Facility: CLINIC | Age: 29
End: 2024-03-07
Payer: COMMERCIAL

## 2024-03-07 VITALS — DIASTOLIC BLOOD PRESSURE: 70 MMHG | SYSTOLIC BLOOD PRESSURE: 110 MMHG | WEIGHT: 119 LBS | BODY MASS INDEX: 21.08 KG/M2

## 2024-03-07 DIAGNOSIS — F32.81 PMDD (PREMENSTRUAL DYSPHORIC DISORDER): Primary | ICD-10-CM

## 2024-03-07 DIAGNOSIS — Z12.4 SCREENING FOR MALIGNANT NEOPLASM OF CERVIX: ICD-10-CM

## 2024-03-07 DIAGNOSIS — Z11.51 SCREENING FOR HUMAN PAPILLOMAVIRUS (HPV): ICD-10-CM

## 2024-03-07 PROCEDURE — 99213 OFFICE O/P EST LOW 20 MIN: CPT | Performed by: NURSE PRACTITIONER

## 2024-03-07 PROCEDURE — 1036F TOBACCO NON-USER: CPT | Performed by: NURSE PRACTITIONER

## 2024-03-07 PROCEDURE — 88175 CYTOPATH C/V AUTO FLUID REDO: CPT

## 2024-03-07 NOTE — PROGRESS NOTES
Subjective   Patient ID: Kristina Manning is a 28 y.o. female who presents for Follow-up (Medication- FLUoxetine (PROzac) 10 mg capsule-pt discontinued due to malaise, brain fog /Reviewing  EMR/Last Annual Exam: 11/05/2021/- patient is agreeable to physician assistant student-  ) and re-collect pap.  HPI  Here for follow-up after starting fluoxetine for PMDD and for recollection of her PAP d.t it being unable to be resulted because of excess blood.   She does not like the fluoxetine and has since stopped it. She does not want to try anything else at this time.     Review of Systems   All other systems reviewed and are negative.      Objective   Physical Exam  Constitutional:       Appearance: Normal appearance.   Pulmonary:      Effort: Pulmonary effort is normal.      Breath sounds: Normal breath sounds.   Genitourinary:     General: Normal vulva.      Vagina: Normal.      Cervix: Normal.   Neurological:      Mental Status: She is alert.   Psychiatric:         Mood and Affect: Mood normal.         Behavior: Behavior normal.         Assessment/Plan   Diagnoses and all orders for this visit:  PMDD (premenstrual dysphoric disorder)  She will monitor symptoms. Will follow-up in the office as needed.   Screening for malignant neoplasm of cervix  thinprep  Screening for human papillomavirus (HPV)  Thinprep  Follow-up annually; sooner if needed.          MALCOLM You 03/07/24 11:26 AM

## 2024-03-22 LAB
CYTOLOGY CMNT CVX/VAG CYTO-IMP: NORMAL
LAB AP HPV GENOTYPE QUESTION: YES
LAB AP HPV HR: NORMAL
LABORATORY COMMENT REPORT: NORMAL
LMP START DATE: NORMAL
PATH REPORT.TOTAL CANCER: NORMAL

## 2024-04-11 ENCOUNTER — TELEPHONE (OUTPATIENT)
Dept: OBSTETRICS AND GYNECOLOGY | Facility: CLINIC | Age: 29
End: 2024-04-11
Payer: COMMERCIAL

## 2024-04-11 DIAGNOSIS — B00.9 HERPES: ICD-10-CM

## 2024-04-11 RX ORDER — VALACYCLOVIR HYDROCHLORIDE 1 G/1
1000 TABLET, FILM COATED ORAL DAILY
Qty: 90 TABLET | Refills: 3 | Status: SHIPPED | OUTPATIENT
Start: 2024-04-11 | End: 2025-04-11

## 2024-04-11 NOTE — TELEPHONE ENCOUNTER
[facility]    Valcyclovir 1 gm  1 daily      Please refill this medication.    Annual was in February of 2024

## 2024-05-04 ENCOUNTER — HOSPITAL ENCOUNTER (EMERGENCY)
Facility: HOSPITAL | Age: 29
Discharge: HOME | End: 2024-05-04
Attending: STUDENT IN AN ORGANIZED HEALTH CARE EDUCATION/TRAINING PROGRAM
Payer: COMMERCIAL

## 2024-05-04 VITALS
RESPIRATION RATE: 20 BRPM | HEART RATE: 89 BPM | SYSTOLIC BLOOD PRESSURE: 113 MMHG | WEIGHT: 118 LBS | OXYGEN SATURATION: 98 % | HEIGHT: 63 IN | BODY MASS INDEX: 20.91 KG/M2 | TEMPERATURE: 97.3 F | DIASTOLIC BLOOD PRESSURE: 79 MMHG

## 2024-05-04 DIAGNOSIS — J01.90 ACUTE NON-RECURRENT SINUSITIS, UNSPECIFIED LOCATION: Primary | ICD-10-CM

## 2024-05-04 LAB
ALBUMIN SERPL BCP-MCNC: 4.1 G/DL (ref 3.4–5)
ALP SERPL-CCNC: 46 U/L (ref 33–110)
ALT SERPL W P-5'-P-CCNC: 10 U/L (ref 7–45)
ANION GAP SERPL CALC-SCNC: 9 MMOL/L (ref 10–20)
APPEARANCE UR: ABNORMAL
AST SERPL W P-5'-P-CCNC: 13 U/L (ref 9–39)
BASOPHILS # BLD AUTO: 0.04 X10*3/UL (ref 0–0.1)
BASOPHILS NFR BLD AUTO: 0.4 %
BILIRUB SERPL-MCNC: 0.2 MG/DL (ref 0–1.2)
BILIRUB UR STRIP.AUTO-MCNC: NEGATIVE MG/DL
BUN SERPL-MCNC: 17 MG/DL (ref 6–23)
CALCIUM SERPL-MCNC: 9.1 MG/DL (ref 8.6–10.3)
CHLORIDE SERPL-SCNC: 102 MMOL/L (ref 98–107)
CO2 SERPL-SCNC: 31 MMOL/L (ref 21–32)
COLOR UR: YELLOW
CREAT SERPL-MCNC: 0.9 MG/DL (ref 0.5–1.05)
EGFRCR SERPLBLD CKD-EPI 2021: 89 ML/MIN/1.73M*2
EOSINOPHIL # BLD AUTO: 0.14 X10*3/UL (ref 0–0.7)
EOSINOPHIL NFR BLD AUTO: 1.5 %
ERYTHROCYTE [DISTWIDTH] IN BLOOD BY AUTOMATED COUNT: 12.4 % (ref 11.5–14.5)
GLUCOSE SERPL-MCNC: 82 MG/DL (ref 74–99)
GLUCOSE UR STRIP.AUTO-MCNC: NEGATIVE MG/DL
HCG UR QL IA.RAPID: NEGATIVE
HCT VFR BLD AUTO: 37.6 % (ref 36–46)
HGB BLD-MCNC: 11.8 G/DL (ref 12–16)
IMM GRANULOCYTES # BLD AUTO: 0.03 X10*3/UL (ref 0–0.7)
IMM GRANULOCYTES NFR BLD AUTO: 0.3 % (ref 0–0.9)
KETONES UR STRIP.AUTO-MCNC: NEGATIVE MG/DL
LACTATE SERPL-SCNC: 0.5 MMOL/L (ref 0.4–2)
LEUKOCYTE ESTERASE UR QL STRIP.AUTO: NEGATIVE
LYMPHOCYTES # BLD AUTO: 3.17 X10*3/UL (ref 1.2–4.8)
LYMPHOCYTES NFR BLD AUTO: 33.5 %
MCH RBC QN AUTO: 29.6 PG (ref 26–34)
MCHC RBC AUTO-ENTMCNC: 31.4 G/DL (ref 32–36)
MCV RBC AUTO: 94 FL (ref 80–100)
MONOCYTES # BLD AUTO: 0.58 X10*3/UL (ref 0.1–1)
MONOCYTES NFR BLD AUTO: 6.1 %
NEUTROPHILS # BLD AUTO: 5.51 X10*3/UL (ref 1.2–7.7)
NEUTROPHILS NFR BLD AUTO: 58.2 %
NITRITE UR QL STRIP.AUTO: NEGATIVE
NRBC BLD-RTO: 0 /100 WBCS (ref 0–0)
PH UR STRIP.AUTO: 8 [PH]
PLATELET # BLD AUTO: 362 X10*3/UL (ref 150–450)
POTASSIUM SERPL-SCNC: 4.1 MMOL/L (ref 3.5–5.3)
PROT SERPL-MCNC: 7.3 G/DL (ref 6.4–8.2)
PROT UR STRIP.AUTO-MCNC: NEGATIVE MG/DL
RBC # BLD AUTO: 3.99 X10*6/UL (ref 4–5.2)
RBC # UR STRIP.AUTO: NEGATIVE /UL
S PYO DNA THROAT QL NAA+PROBE: NOT DETECTED
SODIUM SERPL-SCNC: 138 MMOL/L (ref 136–145)
SP GR UR STRIP.AUTO: 1.02
UROBILINOGEN UR STRIP.AUTO-MCNC: <2 MG/DL
WBC # BLD AUTO: 9.5 X10*3/UL (ref 4.4–11.3)

## 2024-05-04 PROCEDURE — 96374 THER/PROPH/DIAG INJ IV PUSH: CPT

## 2024-05-04 PROCEDURE — 85025 COMPLETE CBC W/AUTO DIFF WBC: CPT

## 2024-05-04 PROCEDURE — 83605 ASSAY OF LACTIC ACID: CPT

## 2024-05-04 PROCEDURE — 36415 COLL VENOUS BLD VENIPUNCTURE: CPT

## 2024-05-04 PROCEDURE — 81003 URINALYSIS AUTO W/O SCOPE: CPT

## 2024-05-04 PROCEDURE — 80053 COMPREHEN METABOLIC PANEL: CPT

## 2024-05-04 PROCEDURE — 2500000004 HC RX 250 GENERAL PHARMACY W/ HCPCS (ALT 636 FOR OP/ED)

## 2024-05-04 PROCEDURE — 99284 EMERGENCY DEPT VISIT MOD MDM: CPT | Mod: 25

## 2024-05-04 PROCEDURE — 81025 URINE PREGNANCY TEST: CPT

## 2024-05-04 PROCEDURE — 96361 HYDRATE IV INFUSION ADD-ON: CPT

## 2024-05-04 PROCEDURE — 2500000001 HC RX 250 WO HCPCS SELF ADMINISTERED DRUGS (ALT 637 FOR MEDICARE OP)

## 2024-05-04 PROCEDURE — 87651 STREP A DNA AMP PROBE: CPT

## 2024-05-04 RX ORDER — ACETAMINOPHEN 325 MG/1
650 TABLET ORAL ONCE
Status: DISCONTINUED | OUTPATIENT
Start: 2024-05-04 | End: 2024-05-04

## 2024-05-04 RX ORDER — KETOROLAC TROMETHAMINE 15 MG/ML
15 INJECTION, SOLUTION INTRAMUSCULAR; INTRAVENOUS ONCE
Status: COMPLETED | OUTPATIENT
Start: 2024-05-04 | End: 2024-05-04

## 2024-05-04 RX ORDER — AMOXICILLIN AND CLAVULANATE POTASSIUM 875; 125 MG/1; MG/1
1 TABLET, FILM COATED ORAL ONCE
Status: COMPLETED | OUTPATIENT
Start: 2024-05-04 | End: 2024-05-04

## 2024-05-04 RX ORDER — FLUTICASONE PROPIONATE 50 MCG
1 SPRAY, SUSPENSION (ML) NASAL DAILY
Qty: 16 G | Refills: 0 | Status: SHIPPED | OUTPATIENT
Start: 2024-05-04 | End: 2024-06-03

## 2024-05-04 RX ORDER — AMOXICILLIN AND CLAVULANATE POTASSIUM 875; 125 MG/1; MG/1
1 TABLET, FILM COATED ORAL EVERY 12 HOURS
Qty: 14 TABLET | Refills: 0 | Status: SHIPPED | OUTPATIENT
Start: 2024-05-04 | End: 2024-05-11

## 2024-05-04 RX ADMIN — KETOROLAC TROMETHAMINE 15 MG: 15 INJECTION, SOLUTION INTRAMUSCULAR; INTRAVENOUS at 22:39

## 2024-05-04 RX ADMIN — SODIUM CHLORIDE 1000 ML: 9 INJECTION, SOLUTION INTRAVENOUS at 22:34

## 2024-05-04 RX ADMIN — AMOXICILLIN AND CLAVULANATE POTASSIUM 1 TABLET: 875; 125 TABLET, FILM COATED ORAL at 23:57

## 2024-05-04 ASSESSMENT — PAIN DESCRIPTION - PAIN TYPE: TYPE: ACUTE PAIN

## 2024-05-04 ASSESSMENT — COLUMBIA-SUICIDE SEVERITY RATING SCALE - C-SSRS
6. HAVE YOU EVER DONE ANYTHING, STARTED TO DO ANYTHING, OR PREPARED TO DO ANYTHING TO END YOUR LIFE?: NO
2. HAVE YOU ACTUALLY HAD ANY THOUGHTS OF KILLING YOURSELF?: NO
1. IN THE PAST MONTH, HAVE YOU WISHED YOU WERE DEAD OR WISHED YOU COULD GO TO SLEEP AND NOT WAKE UP?: NO

## 2024-05-04 ASSESSMENT — PAIN DESCRIPTION - ORIENTATION: ORIENTATION: LEFT

## 2024-05-04 ASSESSMENT — PAIN SCALES - GENERAL: PAINLEVEL_OUTOF10: 7

## 2024-05-04 ASSESSMENT — PAIN DESCRIPTION - LOCATION: LOCATION: FACE

## 2024-05-04 ASSESSMENT — PAIN - FUNCTIONAL ASSESSMENT: PAIN_FUNCTIONAL_ASSESSMENT: 0-10

## 2024-05-04 ASSESSMENT — PAIN DESCRIPTION - DESCRIPTORS: DESCRIPTORS: BURNING

## 2024-05-05 LAB — HOLD SPECIMEN: NORMAL

## 2024-05-05 NOTE — ED TRIAGE NOTES
Pt c/o 3 weeks of fever, chills, left face pain, dizzyness, rashes on her back and 3 days of urinary frequency as well as dysuria.

## 2024-05-05 NOTE — ED PROVIDER NOTES
Limitations to history: None  Independent Historians: Family  External Records Reviewed: HIE, OARRS, outpatient notes, inpatient notes, paper charts if needed    History of Present Illness:  Patient is a 29-year-old female presents to ED chief complaint of urinary frequency as well as a possible sinus infection for the past 3 weeks.  Patient also reports intermittent fevers, chills and rash.  Patient reports is difficult to blow her nose due to the sinus pain.  Patient denies any nausea, vomiting, diarrhea.  Patient reports her feels as if her symptoms have become worse so she wanted to be evaluated this evening.  Patient is alert and oriented x 3, present to ED with significant other.    Denies HA, C/P, SOB, ABD pain, Nausea, Vomiting, Diarrhea, Weakness, Dizziness.    PMFSH:   As per HPI, otherwise nurses notes reviewed in EMR    Physical Exam:  Appearance: Alert, oriented x3, supine on exam table with head elevated, cooperative, in no acute distress. Well nourished & well hydrated.      Skin: Intact, dry skin, no lesions, rash, petechiae or purpura.     Eyes: PERRLA, EOMs intact, Conjunctiva pink with no redness or exudates. No scleral icterus.     Ears: Left and right tympanic membranes are nonbulging.  Ear canals are nonerythematous.  Hearing grossly intact.      Nose: Nares patent, no epistaxis.     Mouth: Oropharynx is mildly erythematous, tonsils are without exudate or hypertrophy.  Dentition without concerning abnormalities. no obstruction of posterior pharynx.     Neck: Supple, without meningismus. Trachea at midline.     Pulmonary: Clear bilaterally with good chest wall excursion. No rales, rhonchi or wheezing. No accessory muscle use or stridor. Talking in full sentences.     Cardiac: Normal S1, S2 without murmur, rub, gallop or extrasystole.     Abdomen: Soft, nontender to light and deep palpation to all quadrants, normoactive bowel sounds.  No palpable organomegaly.  No rebound or guarding.      Genitourinary: Physical exam deferred.     Musculoskeletal: Normal gait. Full range of motion to all extremities. Rest of the exam reveals no pain on palpation, instability, or deformity. Pulses full and equal. No cyanosis or clubbing. capillary refill <2 seconds to all examined digits.     Neurological:  Cranial nerves II through XII are grossly intact, normal sensation, no weakness, no focal findings identified.      Psychiatric: Appropriate mood and affect.      Labs Reviewed   CBC WITH AUTO DIFFERENTIAL - Abnormal       Result Value    WBC 9.5      nRBC 0.0      RBC 3.99 (*)     Hemoglobin 11.8 (*)     Hematocrit 37.6      MCV 94      MCH 29.6      MCHC 31.4 (*)     RDW 12.4      Platelets 362      Neutrophils % 58.2      Immature Granulocytes %, Automated 0.3      Lymphocytes % 33.5      Monocytes % 6.1      Eosinophils % 1.5      Basophils % 0.4      Neutrophils Absolute 5.51      Immature Granulocytes Absolute, Automated 0.03      Lymphocytes Absolute 3.17      Monocytes Absolute 0.58      Eosinophils Absolute 0.14      Basophils Absolute 0.04     COMPREHENSIVE METABOLIC PANEL - Abnormal    Glucose 82      Sodium 138      Potassium 4.1      Chloride 102      Bicarbonate 31      Anion Gap 9 (*)     Urea Nitrogen 17      Creatinine 0.90      eGFR 89      Calcium 9.1      Albumin 4.1      Alkaline Phosphatase 46      Total Protein 7.3      AST 13      Bilirubin, Total 0.2      ALT 10     URINALYSIS WITH REFLEX CULTURE AND MICROSCOPIC - Abnormal    Color, Urine Yellow      Appearance, Urine Hazy (*)     Specific Gravity, Urine 1.020      pH, Urine 8.0      Protein, Urine NEGATIVE      Glucose, Urine NEGATIVE      Blood, Urine NEGATIVE      Ketones, Urine NEGATIVE      Bilirubin, Urine NEGATIVE      Urobilinogen, Urine <2.0      Nitrite, Urine NEGATIVE      Leukocyte Esterase, Urine NEGATIVE     GROUP A STREPTOCOCCUS, PCR - Normal    Group A Strep PCR Not Detected     LACTATE - Normal    Lactate 0.5       Narrative:     Venipuncture immediately after or during the administration of Metamizole may lead to falsely low results. Testing should be performed immediately  prior to Metamizole dosing.   HCG, URINE, QUALITATIVE - Normal    HCG, Urine NEGATIVE     URINALYSIS WITH REFLEX CULTURE AND MICROSCOPIC    Narrative:     The following orders were created for panel order Urinalysis with Reflex Culture and Microscopic.  Procedure                               Abnormality         Status                     ---------                               -----------         ------                     Urinalysis with Reflex C...[[id number]]  Abnormal            Final result               Extra Urine Gray Tube[[id number]]                            In process                   Please view results for these tests on the individual orders.   EXTRA URINE GRAY TUBE      No orders to display        Repeat Evaluation below    Summary:  Medical Decision Making:   Patient presented as described in HPI. Patient case including ROS, PE, and treatment and plan discussed with ED attending if attached as cosigner. Due to patients presentation orders completed include as documented.  Patient evaluated for complaints of urinary frequency as well as a possible sinus infection.  Patient reported intermittent fevers and chills.  Patient was found to be afebrile, nontachycardic, nonhypoxic.  Lab work was obtained while in ED.  Lab work was within normal limits.  Patient refused COVID, RSV swab.  Patient was found to be strep negative.  Urinalysis revealed no evidence of urinary tract infection.  Patient reports an improvement in symptoms after Toradol and IV fluids.  Patient will be treated for sinusitis, will be prescribed Augmentin.  Patient aware to take Tylenol and/or Motrin as needed for analgesia, and follow-up with primary care provider within 1 to 2 weeks.  Patient given strict return precautions that if her symptoms worsen she needs to return to  ED for further evaluation and treatment, otherwise follow-up with primary care provider as directed.  Patient was advised to follow up with PCP or recommended provider in 2-3 days for another evaluation and exam. I advised patient/guardian to return or go to closest emergency room immediately if symptoms change, get worse, new symptoms develop prior to follow up. If there is no improvement in symptoms in the next 24 hours they are advised to return for further evaluation and exam. I also explained the plan and treatment course. Patient/guardian is in agreement with plan, treatment course, and follow up and states verbally that they will comply.    Tests/Medications/Escalations of Care considered but not given:    Patient care discussed with: N/A  Social Determinants affecting care: N/A    Final diagnosis and disposition as documented in impression    Homegoing. I discussed the differential; results and discharge plan with the patient and/or family/friend/caregiver if present.  I emphasized the importance of follow-up with the physician I referred them to in the timeframe recommended.  I explained reasons for the patient to return to the Emergency Department. They agreed that if they feel their condition is worsening or if they have any other concern they should call 911 immediately for further assistance. I gave the patient an opportunity to ask all questions they had and answered all of them accordingly. They understand return precautions and discharge instructions. The patient and/or family/friend/caregiver expressed understanding verbally and that they would comply.       Disposition:  Discharge         This note has been transcribed using voice recognition and may contain grammatical errors, misplaced words, incorrect words, incorrect phrases or other errors.     MALCOLM Fuller  05/04/24 [id number]       SHANTI Fuller-CNP  05/04/24 [id number]

## 2024-05-14 ENCOUNTER — APPOINTMENT (OUTPATIENT)
Dept: OBSTETRICS AND GYNECOLOGY | Facility: CLINIC | Age: 29
End: 2024-05-14
Payer: COMMERCIAL

## 2024-07-23 ENCOUNTER — TELEPHONE (OUTPATIENT)
Dept: OBSTETRICS AND GYNECOLOGY | Facility: CLINIC | Age: 29
End: 2024-07-23
Payer: COMMERCIAL

## 2024-07-23 NOTE — TELEPHONE ENCOUNTER
I spoke with patient, Rosagard was inserted 7/13/2021. Patient complains of irregular bleeding with the IUD since 12/2023. States she has pain and discomfort. Bleeding stopped today. Patient is scheduled for IUD removal 7/24/24. Patient was informed if she is unable to come into office tomorrow for removal, first available is not until 8/5. She is unable to come in unless its a Thursday or a Friday. She then was offered first available on 8/8/24 in [place]. I did advise patient I would keep appointment that is scheduled for tomorrow with Radha if the pain is significant enough. She declines.

## 2024-07-23 NOTE — TELEPHONE ENCOUNTER
Patient called stating she has an appointment tmrw for an Iud removal stating she cannot come in due to her work schedule. Patient stating she wants it to be out soon as she doesn't want to wait until next opening which is 8/5. Please advise on where to schedule patient. Patient is experiencing heavy bleeding

## 2024-07-24 ENCOUNTER — APPOINTMENT (OUTPATIENT)
Dept: OBSTETRICS AND GYNECOLOGY | Facility: CLINIC | Age: 29
End: 2024-07-24
Payer: COMMERCIAL

## 2024-08-08 ENCOUNTER — APPOINTMENT (OUTPATIENT)
Dept: OBSTETRICS AND GYNECOLOGY | Facility: CLINIC | Age: 29
End: 2024-08-08
Payer: COMMERCIAL

## 2024-08-09 ENCOUNTER — APPOINTMENT (OUTPATIENT)
Dept: OBSTETRICS AND GYNECOLOGY | Facility: CLINIC | Age: 29
End: 2024-08-09
Payer: COMMERCIAL

## 2024-08-09 ENCOUNTER — HOSPITAL ENCOUNTER (OUTPATIENT)
Dept: RADIOLOGY | Facility: HOSPITAL | Age: 29
Discharge: HOME | End: 2024-08-09
Payer: COMMERCIAL

## 2024-08-09 VITALS — DIASTOLIC BLOOD PRESSURE: 60 MMHG | SYSTOLIC BLOOD PRESSURE: 92 MMHG

## 2024-08-09 DIAGNOSIS — Z11.3 SCREENING FOR STD (SEXUALLY TRANSMITTED DISEASE): ICD-10-CM

## 2024-08-09 DIAGNOSIS — T83.32XA INTRAUTERINE CONTRACEPTIVE DEVICE THREADS LOST, INITIAL ENCOUNTER: ICD-10-CM

## 2024-08-09 DIAGNOSIS — Z32.02 URINE PREGNANCY TEST NEGATIVE: ICD-10-CM

## 2024-08-09 DIAGNOSIS — T83.32XA INTRAUTERINE CONTRACEPTIVE DEVICE THREADS LOST, INITIAL ENCOUNTER: Primary | ICD-10-CM

## 2024-08-09 LAB — PREGNANCY TEST URINE, POC: NEGATIVE

## 2024-08-09 PROCEDURE — 87491 CHLMYD TRACH DNA AMP PROBE: CPT

## 2024-08-09 PROCEDURE — 99213 OFFICE O/P EST LOW 20 MIN: CPT | Performed by: NURSE PRACTITIONER

## 2024-08-09 PROCEDURE — 76830 TRANSVAGINAL US NON-OB: CPT

## 2024-08-09 PROCEDURE — 87591 N.GONORRHOEAE DNA AMP PROB: CPT

## 2024-08-09 PROCEDURE — 81025 URINE PREGNANCY TEST: CPT | Performed by: NURSE PRACTITIONER

## 2024-08-09 NOTE — PROGRESS NOTES
Subjective   Patient ID: Kristina Manning is a 29 y.o. female who presents for Removal of an intrauterine device (C/O Irregular bleeding/Reviewing  EMR/Paragard inserted 07/13/2021/Last Annual Exam: 02/13/2024/Negative Pap 2024/- patient declined a chaperone-/).  HPI  Here for IUD removal. States her strings were cut short; she has not been able to feel them lately.   C/o pelvic pain with her IUD. Having prolonged periods lasting up to two weeks. She would like it removed. Requests STD testing today. Has a Paragard.     Review of Systems   Genitourinary:  Positive for pelvic pain.   All other systems reviewed and are negative.      Objective   Physical Exam  Constitutional:       Appearance: Normal appearance.   Pulmonary:      Effort: Pulmonary effort is normal.   Genitourinary:     General: Normal vulva.      Cervix: Normal.      Comments: No IUD strings noted on exam.   Psychiatric:         Mood and Affect: Mood normal.         Behavior: Behavior normal.       Patient ID: Kristina Manning is a 29 y.o. female.    IUD Removal    Performed by: MALCOLM You  Authorized by: MALCOLM You    Procedure: IUD removal    Consent obtained by patient, parent, or legal power of  - including discussion of procedure risks and benefits, patient questions answered, and patient education provided: yes    Reason for removal: patient request    Strings visualized: no    Cervix cleaned with: iodopovidone    Tenaculum applied to cervix: no    Removal comments: No strings noted on exam. No strings noted with use of cytobrush. Attempted to remove IUD blindly; unsuccessful. Pt tolerated well.     Assessment/Plan   Diagnoses and all orders for this visit:  Intrauterine contraceptive device threads lost, initial encounter  Here for IUD removal. Consent signed electronically. Strings were not visualized on exam. Attempted to remove IUD blindly; unsuccessful. Urine HCG is negative today. Will proceed with  ultrasound and then follow-up with physicians for removal of IUD.   Screening for STD (sexually transmitted disease)  -     C. Trachomatis / N. Gonorrhoeae, Amplified Detection  Urine pregnancy test negative  -     POCT pregnancy, urine manually resulted  Other orders  -     IUD Removal  Follow-up ending results of ultrasound.        SHANTI You-CNP 08/09/24 9:03 AM

## 2024-08-10 LAB
C TRACH RRNA SPEC QL NAA+PROBE: NEGATIVE
N GONORRHOEA DNA SPEC QL PROBE+SIG AMP: NEGATIVE

## 2024-08-24 PROBLEM — Z30.432 ENCOUNTER FOR IUD REMOVAL: Status: ACTIVE | Noted: 2024-08-24

## 2024-08-28 ENCOUNTER — APPOINTMENT (OUTPATIENT)
Dept: OBSTETRICS AND GYNECOLOGY | Facility: CLINIC | Age: 29
End: 2024-08-28
Payer: COMMERCIAL

## 2024-11-13 ENCOUNTER — TELEPHONE (OUTPATIENT)
Dept: OBSTETRICS AND GYNECOLOGY | Facility: CLINIC | Age: 29
End: 2024-11-13
Payer: COMMERCIAL

## 2024-11-13 NOTE — TELEPHONE ENCOUNTER
Spoke with patient. Advised patient that she would need to go to lab to have a urine culture collected prior to antibiotics. Patient is unable to do that today or tomorrow. Patient then advised that her best option is to go to the urgent care for evaluation due to their extended hours of service.

## 2024-11-13 NOTE — TELEPHONE ENCOUNTER
Patient called stating she thinks she has a UTI. Experiencing burning, small amounts of blood when urinates. Asking for a prescription     Pharmacy [facility]

## 2024-11-26 ENCOUNTER — TELEPHONE (OUTPATIENT)
Dept: OBSTETRICS AND GYNECOLOGY | Facility: CLINIC | Age: 29
End: 2024-11-26
Payer: COMMERCIAL

## 2024-11-26 DIAGNOSIS — N89.8 VAGINAL DISCHARGE: Primary | ICD-10-CM

## 2024-11-26 RX ORDER — FLUCONAZOLE 150 MG/1
150 TABLET ORAL ONCE
Qty: 2 TABLET | Refills: 0 | Status: SHIPPED | OUTPATIENT
Start: 2024-11-26 | End: 2024-11-26

## 2024-11-26 NOTE — TELEPHONE ENCOUNTER
Patient called in stating she thinks she has a yeast infection.  She was just on an antibiotic for a UTI.  She is c/o thick white discharge, itchy and swollen vagina.  Can you call her in a prescription she would like to know?      [facility]

## 2025-02-18 ENCOUNTER — TELEPHONE (OUTPATIENT)
Dept: OBSTETRICS AND GYNECOLOGY | Facility: CLINIC | Age: 30
End: 2025-02-18
Payer: COMMERCIAL

## 2025-02-18 DIAGNOSIS — N89.8 VAGINAL DISCHARGE: Primary | ICD-10-CM

## 2025-02-18 RX ORDER — FLUCONAZOLE 150 MG/1
150 TABLET ORAL ONCE
Qty: 2 TABLET | Refills: 0 | Status: SHIPPED | OUTPATIENT
Start: 2025-02-18 | End: 2025-02-18

## 2025-02-18 RX ORDER — FLUCONAZOLE 150 MG/1
150 TABLET ORAL ONCE
COMMUNITY
Start: 2024-11-26

## 2025-02-18 NOTE — TELEPHONE ENCOUNTER
Patient would like to know if something can be called in for her for a yeast infections. She has been on antibiotics for a UTI and it gave her a yeast infection.     Pharmacy- [facility] in [place]

## 2025-02-19 ENCOUNTER — TELEPHONE (OUTPATIENT)
Dept: OBSTETRICS AND GYNECOLOGY | Facility: CLINIC | Age: 30
End: 2025-02-19
Payer: COMMERCIAL

## 2025-02-19 NOTE — TELEPHONE ENCOUNTER
Patient is scheduled with Dr. Moreno on 3/13 to try and get her IUD removed, but states that she has been heavily bleeding since 1/30 and is not sure what to do. Would like a call back please.

## 2025-02-19 NOTE — TELEPHONE ENCOUNTER
Paragard was inserted 7/13/2021. Last visit with Diane 8/9/2024, Paragard was attemped but unsuccessful. Patient was to follow up with ultrasound and with a Doctor for removal. Please advise

## 2025-02-19 NOTE — TELEPHONE ENCOUNTER
If she wants to come in for an appointment, I am happy to see her in [place] in the next two days. I did call in [facility] for her yesterday but she did not mention anything about her bleeding.

## 2025-02-20 ENCOUNTER — OFFICE VISIT (OUTPATIENT)
Dept: OBSTETRICS AND GYNECOLOGY | Facility: CLINIC | Age: 30
End: 2025-02-20
Payer: COMMERCIAL

## 2025-02-20 VITALS — DIASTOLIC BLOOD PRESSURE: 60 MMHG | SYSTOLIC BLOOD PRESSURE: 102 MMHG

## 2025-02-20 DIAGNOSIS — F32.A DEPRESSION, UNSPECIFIED DEPRESSION TYPE: ICD-10-CM

## 2025-02-20 DIAGNOSIS — Z97.5 ATTEMPTED IUD REMOVAL, UNSUCCESSFUL: Primary | ICD-10-CM

## 2025-02-20 DIAGNOSIS — Z53.8 ATTEMPTED IUD REMOVAL, UNSUCCESSFUL: Primary | ICD-10-CM

## 2025-02-20 RX ORDER — BUPROPION HYDROCHLORIDE 150 MG/1
150 TABLET ORAL DAILY
Qty: 30 TABLET | Refills: 6 | Status: SHIPPED | OUTPATIENT
Start: 2025-02-20 | End: 2026-02-20

## 2025-02-20 NOTE — PROGRESS NOTES
Subjective   Patient ID: Kristina Manning is a 29 y.o. female who presents for Vaginal Bleeding (X 1 month, discuss mood/).  HPI  Has been having irregular bleeding x 1 month with her Paraguard. She would like her IUD removed, but strings were unable to be visualized on last exam. IUD placement was confirmed by ultrasound.   Declines STD testing; not currently sexually active.        Review of Systems    Objective   Physical Exam    Assessment/Plan   Diagnoses and all orders for this visit:  Attempted IUD removal, unsuccessful  Depression, unspecified depression type  -     buPROPion XL (Wellbutrin XL) 150 mg 24 hr tablet; Take 1 tablet (150 mg) by mouth once daily. Do not crush, chew, or split.  -     TSH with reflex to Free T4 if abnormal; Future  -     Vitamin D 25-Hydroxy,Total (for eval of Vitamin D levels); Future         SHANTI You-CNP 02/20/25 9:38 AM    unsuccessful  Multiple attempts made to grasp IUD strings blindly; unsuccessful. Pt tolerated procedure well.   Depression, unspecified depression type  -     buPROPion XL (Wellbutrin XL) 150 mg 24 hr tablet; Take 1 tablet (150 mg) by mouth once daily. Do not crush, chew, or split.  -     TSH with reflex to Free T4 if abnormal; Future  -     Vitamin D 25-Hydroxy,Total (for eval of Vitamin D levels); Future  She yuniel keep her appointment with Dr Moreno for IUD removal. She will check in with the office, or message by [employer] an update about the wellbutrin. Given [facility] line number number.        Diane Yi, APRN-CNP 02/20/25 9:38 AM

## 2025-02-21 DIAGNOSIS — E55.9 VITAMIN D DEFICIENCY: Primary | ICD-10-CM

## 2025-02-21 LAB
25(OH)D3+25(OH)D2 SERPL-MCNC: 19 NG/ML (ref 30–100)
TSH SERPL-ACNC: 2.19 MIU/L

## 2025-02-21 RX ORDER — ERGOCALCIFEROL 1.25 MG/1
1.25 CAPSULE ORAL
Qty: 6 CAPSULE | Refills: 0 | Status: SHIPPED | OUTPATIENT
Start: 2025-02-23 | End: 2025-02-24 | Stop reason: SDUPTHER

## 2025-02-24 ENCOUNTER — TELEPHONE (OUTPATIENT)
Dept: OBSTETRICS AND GYNECOLOGY | Facility: CLINIC | Age: 30
End: 2025-02-24
Payer: COMMERCIAL

## 2025-02-24 DIAGNOSIS — E55.9 VITAMIN D DEFICIENCY: ICD-10-CM

## 2025-02-24 RX ORDER — ERGOCALCIFEROL 1.25 MG/1
1.25 CAPSULE ORAL
Qty: 6 CAPSULE | Refills: 0 | Status: SHIPPED | OUTPATIENT
Start: 2025-03-02 | End: 2025-04-13

## 2025-02-24 NOTE — TELEPHONE ENCOUNTER
Patient calling re: rx for Vitamin D  Looks like went to Mail order- patient does not use mail order pharmacy   Needs sent to [facility]

## 2025-02-26 ASSESSMENT — ENCOUNTER SYMPTOMS
SLEEP DISTURBANCE: 1
DYSPHORIC MOOD: 1

## 2025-03-07 ENCOUNTER — APPOINTMENT (OUTPATIENT)
Dept: OBSTETRICS AND GYNECOLOGY | Facility: CLINIC | Age: 30
End: 2025-03-07
Payer: COMMERCIAL

## 2025-03-07 DIAGNOSIS — E55.9 VITAMIN D DEFICIENCY: ICD-10-CM

## 2025-03-07 DIAGNOSIS — F32.A DEPRESSION, UNSPECIFIED DEPRESSION TYPE: Primary | ICD-10-CM

## 2025-03-07 PROCEDURE — 99212 OFFICE O/P EST SF 10 MIN: CPT | Performed by: NURSE PRACTITIONER

## 2025-03-07 PROCEDURE — 1036F TOBACCO NON-USER: CPT | Performed by: NURSE PRACTITIONER

## 2025-03-07 NOTE — PROGRESS NOTES
Subjective   Patient ID: Kristina Manning is a 29 y.o. female who presents for Follow-up (Medication- buPROPion XL (Wellbutrin XL) 150 mg 24 hr tablet/-virtual visit-/Reviewing  EMR/Negative Pap 2024).  HPI  Here via telehealth for follow-up for depression. She took Wellbutrin, but after two days, she felt the medication was making her feel worse than before. She stopped taking it. She is taking a weekly dose of Vit. D supplement, has taken two doses so far. Feels so far this has been the thing that is making her depression feel better. She plants to complete the 6 weeks of her medication then will transition to a daily multivitamin.     Virtual or Telephone Consent    An interactive audio and video telecommunication system which permits real time communications between the patient (at the originating site) and provider (at the distant site) was utilized to provide this telehealth service.   Verbal consent was requested and obtained from Kristina Manning on this date, 03/07/25 for a telehealth visit and the patient's location was confirmed at the time of the visit.     Review of Systems   All other systems reviewed and are negative.      Objective   Physical Exam  Constitutional:       Appearance: Normal appearance.   Pulmonary:      Effort: Pulmonary effort is normal.   Skin:     Coloration: Skin is not pale.      Findings: No erythema.   Neurological:      Mental Status: She is alert.   Psychiatric:         Mood and Affect: Mood normal.         Behavior: Behavior normal.         Assessment/Plan   Diagnoses and all orders for this visit:  Depression, unspecified depression type  Has stopped her Wellbutrin. Feels she is doing ok without it.  Vitamin D deficiency  Continue Vit. D weekly supplement, then start a daily multivitamin.   Follow-up for IUD removal with Dr Josh Muhammad, SHANTI-CNP 03/07/25 1:19 PM

## 2025-03-09 ENCOUNTER — OFFICE VISIT (OUTPATIENT)
Dept: URGENT CARE | Age: 30
End: 2025-03-09
Payer: COMMERCIAL

## 2025-03-09 VITALS
OXYGEN SATURATION: 98 % | RESPIRATION RATE: 16 BRPM | HEART RATE: 83 BPM | DIASTOLIC BLOOD PRESSURE: 64 MMHG | SYSTOLIC BLOOD PRESSURE: 106 MMHG | TEMPERATURE: 97.3 F

## 2025-03-09 DIAGNOSIS — K13.70 ORAL LESION: ICD-10-CM

## 2025-03-09 DIAGNOSIS — J06.9 VIRAL URI: Primary | ICD-10-CM

## 2025-03-09 DIAGNOSIS — J02.9 SORE THROAT: ICD-10-CM

## 2025-03-09 LAB
POC RAPID INFLUENZA A: NEGATIVE
POC RAPID INFLUENZA B: NEGATIVE
POC RAPID STREP: NEGATIVE

## 2025-03-09 PROCEDURE — 87880 STREP A ASSAY W/OPTIC: CPT | Performed by: PHYSICIAN ASSISTANT

## 2025-03-09 PROCEDURE — 99203 OFFICE O/P NEW LOW 30 MIN: CPT | Performed by: PHYSICIAN ASSISTANT

## 2025-03-09 PROCEDURE — 87804 INFLUENZA ASSAY W/OPTIC: CPT | Performed by: PHYSICIAN ASSISTANT

## 2025-03-09 RX ORDER — LIDOCAINE HYDROCHLORIDE 20 MG/ML
1.25 SOLUTION OROPHARYNGEAL AS NEEDED
Qty: 100 ML | Refills: 0 | Status: SHIPPED | OUTPATIENT
Start: 2025-03-09 | End: 2025-03-12

## 2025-03-09 ASSESSMENT — ENCOUNTER SYMPTOMS
MYALGIAS: 1
COUGH: 1
SHORTNESS OF BREATH: 0
RHINORRHEA: 1
WHEEZING: 0
SORE THROAT: 1
CHEST TIGHTNESS: 0
FEVER: 1
CHILLS: 1

## 2025-03-09 NOTE — PROGRESS NOTES
He does have Subjective   Patient ID: Kristina Manning is a 29 y.o. female. They present today with a chief complaint of Sore Throat (Fever, sore throat x 2 days/Last night noticed blisters in mouth).    History of Present Illness  Patient presents for illness symptoms that began on Friday.  She states she has had fevers, body aches, chills, sore throat, headaches, congestion, productive cough.  She states that she had painful pimple-like blisters on the left side of her mouth yesterday.  She states that she does not see the bumps though still feels the pain today.  She also notes that her left ear has been popping that was not painful.  She denies any chest tightness, wheezing, shortness of breath.  She has tried gargling with salt water without relief.  She states that she does get strep throat though it typically does not show up on a rapid strep.      Sore Throat   Associated symptoms include congestion and coughing. Pertinent negatives include no ear discharge, ear pain or shortness of breath.       Past Medical History  Allergies as of 2025 - Reviewed 2025   Allergen Reaction Noted    Latex Hives 10/20/2023       (Not in a hospital admission)       Past Medical History:   Diagnosis Date    Acute pharyngitis, unspecified 2017    Acute viral pharyngitis    Encounter for  screening, unspecified 2021     screening encounter    Encounter for removal of intrauterine contraceptive device 2020    Encounter for removal of intrauterine contraceptive device (IUD)    Encounter for supervision of other normal pregnancy, first trimester 2020    Multigravida in first trimester    Encounter for supervision of other normal pregnancy, second trimester 2020    Multigravida in second trimester    Encounter for supervision of other normal pregnancy, third trimester 2020    Multigravida in third trimester    Maternal care for other known or suspected poor fetal  growth, unspecified trimester, fetus 1 2021    SGA (small for gestational age), fetal, affecting care of mother, antepartum, unspecified trimester, fetus 1    Migraine without aura, not intractable, without status migrainosus 2017    Common migraine without aura    Other conditions influencing health status 2022    History of cough    Other infections with a predominantly sexual mode of transmission complicating pregnancy, unspecified trimester ([facility]) 2020    Genital herpes complicating pregnancy    Other specified pregnancy related conditions, second trimester ([facility]) 2020    Back pain during pregnancy in second trimester    Other specified pregnancy related conditions, unspecified trimester ([facility]) 2020    Pelvic pain in antepartum period    Other specified pregnancy related conditions, unspecified trimester ([facility]) 2021    Rh negative, antepartum    Other viral diseases complicating pregnancy, unspecified trimester ([facility]) 2021    COVID-19 affecting pregnancy, antepartum    Personal history of other diseases of the female genital tract 2020    History of amenorrhea    Personal history of other diseases of the musculoskeletal system and connective tissue     History of fibromyalgia    Personal history of other diseases of the respiratory system     History of asthma    Pregnancy with inconclusive fetal viability, not applicable or unspecified 2020    Pregnancy with inconclusive fetal viability    Supervision of pregnancy with other poor reproductive or obstetric history, first trimester ([facility]) 10/01/2020    Current pregnancy in first trimester with history of spontaneous  during prior pregnancy    Supervision of pregnancy with other poor reproductive or obstetric history, second trimester ([facility]) 2021    Current pregnancy in second trimester with history of spontaneous  during prior pregnancy    Supervision of  pregnancy with other poor reproductive or obstetric history, third trimester 2021    Current pregnancy in third trimester with history of spontaneous  during prior pregnancy    Unspecified convulsions (Multi) 2017    Generalized convulsive seizure       Past Surgical History:   Procedure Laterality Date    NO PAST SURGERIES          reports that she has never smoked. She has never used smokeless tobacco. She reports that she does not currently use alcohol. She reports that she does not currently use drugs after having used the following drugs: Marijuana.    Review of Systems  Review of Systems   Constitutional:  Positive for chills and fever.   HENT:  Positive for congestion, mouth sores, rhinorrhea and sore throat. Negative for ear discharge and ear pain.    Respiratory:  Positive for cough. Negative for chest tightness, shortness of breath and wheezing.    Musculoskeletal:  Positive for myalgias.                                  Objective    Vitals:    25 0955   BP: 106/64   Pulse: 83   Resp: 16   Temp: 36.3 °C (97.3 °F)   SpO2: 98%     Patient's last menstrual period was 2025.    Physical Exam  Vitals reviewed.   Constitutional:       General: She is not in acute distress.     Appearance: Normal appearance. She is ill-appearing.   HENT:      Right Ear: Tympanic membrane, ear canal and external ear normal.      Left Ear: Tympanic membrane, ear canal and external ear normal.      Nose: Congestion and rhinorrhea present.      Mouth/Throat:      Pharynx: Posterior oropharyngeal erythema present. No pharyngeal swelling or oropharyngeal exudate.      Tonsils: No tonsillar exudate or tonsillar abscesses.      Comments: There are few erythematous papular lesions to the left upper buccal mucosa  Cardiovascular:      Rate and Rhythm: Normal rate and regular rhythm.      Pulses: Normal pulses.      Heart sounds: Normal heart sounds.   Pulmonary:      Effort: Pulmonary effort is normal. No  respiratory distress.      Breath sounds: Normal breath sounds. No wheezing, rhonchi or rales.   Lymphadenopathy:      Cervical: Cervical adenopathy present.   Neurological:      Mental Status: She is alert and oriented to person, place, and time.         Procedures    Point of Care Test & Imaging Results from this visit  Results for orders placed or performed in visit on 03/09/25   POCT Influenza A/B manually resulted   Result Value Ref Range    POC Rapid Influenza A Negative Negative    POC Rapid Influenza B Negative Negative   POCT rapid strep A manually resulted   Result Value Ref Range    POC Rapid Strep Negative Negative      No results found.    Diagnostic study results (if any) were reviewed by Kalyani Taylor PA-C.    Assessment/Plan   Allergies, medications, history, and pertinent labs/EKGs/Imaging reviewed by Kalyani Taylor PA-C.     Medical Decision Making  MDM: History and examination consistent with viral URI.  In-house rapid strep and influenza testing were negative.  A strep PCR was sent given patient's history of positive strep PCR findings previously; she was advised that we would reach out with any positive findings to initiate antibiotic therapy but that if these remain negative this was likely due to a viral illness.  Reassured there are no signs of pneumonia, sinusitis, otitis or other bacterial etiology. Plan at this time is supportive measures and symptomatic care at home; she is provided a prescription for lidocaine solution to apply to oral lesions that are painful.. Advised to go to ER if worsens, otherwise follow with pcp. Patient verbalized understanding and agrees with plan.      Orders and Diagnoses  Diagnoses and all orders for this visit:  Viral URI  Sore throat  -     POCT Influenza A/B manually resulted  -     POCT rapid strep A manually resulted  -     Group A Streptococcus, PCR  Oral lesion  -     lidocaine (Xylocaine) 2 % solution; Take 1.25 mL by mouth if needed for mild pain  (1 - 3) for up to 3 days.      Medical Admin Record      Patient disposition: Home    Electronically signed by Kalyani Taylor PA-C  10:22 AM

## 2025-03-10 LAB — S PYO DNA THROAT QL NAA+PROBE: NOT DETECTED

## 2025-03-13 ENCOUNTER — APPOINTMENT (OUTPATIENT)
Dept: OBSTETRICS AND GYNECOLOGY | Facility: CLINIC | Age: 30
End: 2025-03-13
Payer: COMMERCIAL

## 2025-04-03 ENCOUNTER — APPOINTMENT (OUTPATIENT)
Dept: OBSTETRICS AND GYNECOLOGY | Facility: CLINIC | Age: 30
End: 2025-04-03
Payer: COMMERCIAL

## 2025-04-05 ENCOUNTER — APPOINTMENT (OUTPATIENT)
Dept: CARDIOLOGY | Facility: HOSPITAL | Age: 30
End: 2025-04-05
Payer: COMMERCIAL

## 2025-04-05 ENCOUNTER — HOSPITAL ENCOUNTER (EMERGENCY)
Facility: HOSPITAL | Age: 30
Discharge: HOME | End: 2025-04-05
Attending: EMERGENCY MEDICINE
Payer: COMMERCIAL

## 2025-04-05 ENCOUNTER — APPOINTMENT (OUTPATIENT)
Dept: RADIOLOGY | Facility: HOSPITAL | Age: 30
End: 2025-04-05
Payer: COMMERCIAL

## 2025-04-05 VITALS
HEART RATE: 76 BPM | HEIGHT: 63 IN | DIASTOLIC BLOOD PRESSURE: 76 MMHG | SYSTOLIC BLOOD PRESSURE: 103 MMHG | BODY MASS INDEX: 22.66 KG/M2 | RESPIRATION RATE: 16 BRPM | TEMPERATURE: 97.5 F | OXYGEN SATURATION: 98 % | WEIGHT: 127.87 LBS

## 2025-04-05 DIAGNOSIS — R51.9 NONINTRACTABLE HEADACHE, UNSPECIFIED CHRONICITY PATTERN, UNSPECIFIED HEADACHE TYPE: Primary | ICD-10-CM

## 2025-04-05 LAB
ALBUMIN SERPL BCP-MCNC: 3.9 G/DL (ref 3.4–5)
ALP SERPL-CCNC: 45 U/L (ref 33–110)
ALT SERPL W P-5'-P-CCNC: 11 U/L (ref 7–45)
ANION GAP SERPL CALC-SCNC: 14 MMOL/L (ref 10–20)
AST SERPL W P-5'-P-CCNC: 14 U/L (ref 9–39)
BASOPHILS # BLD AUTO: 0.04 X10*3/UL (ref 0–0.1)
BASOPHILS NFR BLD AUTO: 0.5 %
BILIRUB SERPL-MCNC: 0.3 MG/DL (ref 0–1.2)
BUN SERPL-MCNC: 15 MG/DL (ref 6–23)
CALCIUM SERPL-MCNC: 8.5 MG/DL (ref 8.6–10.3)
CARDIAC TROPONIN I PNL SERPL HS: <3 NG/L (ref 0–13)
CHLORIDE SERPL-SCNC: 103 MMOL/L (ref 98–107)
CO2 SERPL-SCNC: 25 MMOL/L (ref 21–32)
CREAT SERPL-MCNC: 0.6 MG/DL (ref 0.5–1.05)
EGFRCR SERPLBLD CKD-EPI 2021: >90 ML/MIN/1.73M*2
EOSINOPHIL # BLD AUTO: 0.14 X10*3/UL (ref 0–0.7)
EOSINOPHIL NFR BLD AUTO: 1.8 %
ERYTHROCYTE [DISTWIDTH] IN BLOOD BY AUTOMATED COUNT: 12.5 % (ref 11.5–14.5)
GLUCOSE SERPL-MCNC: 86 MG/DL (ref 74–99)
HCT VFR BLD AUTO: 36.5 % (ref 36–46)
HGB BLD-MCNC: 11.7 G/DL (ref 12–16)
IMM GRANULOCYTES # BLD AUTO: 0.02 X10*3/UL (ref 0–0.7)
IMM GRANULOCYTES NFR BLD AUTO: 0.3 % (ref 0–0.9)
LYMPHOCYTES # BLD AUTO: 3.15 X10*3/UL (ref 1.2–4.8)
LYMPHOCYTES NFR BLD AUTO: 41.2 %
MCH RBC QN AUTO: 29.1 PG (ref 26–34)
MCHC RBC AUTO-ENTMCNC: 32.1 G/DL (ref 32–36)
MCV RBC AUTO: 91 FL (ref 80–100)
MONOCYTES # BLD AUTO: 0.54 X10*3/UL (ref 0.1–1)
MONOCYTES NFR BLD AUTO: 7.1 %
NEUTROPHILS # BLD AUTO: 3.76 X10*3/UL (ref 1.2–7.7)
NEUTROPHILS NFR BLD AUTO: 49.1 %
NRBC BLD-RTO: 0 /100 WBCS (ref 0–0)
PLATELET # BLD AUTO: 316 X10*3/UL (ref 150–450)
POTASSIUM SERPL-SCNC: 3.7 MMOL/L (ref 3.5–5.3)
PROT SERPL-MCNC: 6.8 G/DL (ref 6.4–8.2)
RBC # BLD AUTO: 4.02 X10*6/UL (ref 4–5.2)
SODIUM SERPL-SCNC: 138 MMOL/L (ref 136–145)
WBC # BLD AUTO: 7.7 X10*3/UL (ref 4.4–11.3)

## 2025-04-05 PROCEDURE — 85025 COMPLETE CBC W/AUTO DIFF WBC: CPT | Performed by: EMERGENCY MEDICINE

## 2025-04-05 PROCEDURE — 36415 COLL VENOUS BLD VENIPUNCTURE: CPT | Performed by: EMERGENCY MEDICINE

## 2025-04-05 PROCEDURE — 2500000001 HC RX 250 WO HCPCS SELF ADMINISTERED DRUGS (ALT 637 FOR MEDICARE OP): Performed by: EMERGENCY MEDICINE

## 2025-04-05 PROCEDURE — 99285 EMERGENCY DEPT VISIT HI MDM: CPT | Mod: 25 | Performed by: EMERGENCY MEDICINE

## 2025-04-05 PROCEDURE — 96361 HYDRATE IV INFUSION ADD-ON: CPT

## 2025-04-05 PROCEDURE — 70450 CT HEAD/BRAIN W/O DYE: CPT

## 2025-04-05 PROCEDURE — 84484 ASSAY OF TROPONIN QUANT: CPT | Performed by: EMERGENCY MEDICINE

## 2025-04-05 PROCEDURE — 84075 ASSAY ALKALINE PHOSPHATASE: CPT | Performed by: EMERGENCY MEDICINE

## 2025-04-05 PROCEDURE — 93005 ELECTROCARDIOGRAM TRACING: CPT

## 2025-04-05 PROCEDURE — 70450 CT HEAD/BRAIN W/O DYE: CPT | Performed by: RADIOLOGY

## 2025-04-05 PROCEDURE — 71045 X-RAY EXAM CHEST 1 VIEW: CPT

## 2025-04-05 PROCEDURE — 2500000004 HC RX 250 GENERAL PHARMACY W/ HCPCS (ALT 636 FOR OP/ED): Performed by: EMERGENCY MEDICINE

## 2025-04-05 PROCEDURE — 71045 X-RAY EXAM CHEST 1 VIEW: CPT | Performed by: SURGERY

## 2025-04-05 PROCEDURE — 96374 THER/PROPH/DIAG INJ IV PUSH: CPT

## 2025-04-05 RX ORDER — PROCHLORPERAZINE EDISYLATE 5 MG/ML
10 INJECTION INTRAMUSCULAR; INTRAVENOUS ONCE
Status: COMPLETED | OUTPATIENT
Start: 2025-04-05 | End: 2025-04-05

## 2025-04-05 RX ORDER — IBUPROFEN 600 MG/1
600 TABLET ORAL ONCE
Status: COMPLETED | OUTPATIENT
Start: 2025-04-05 | End: 2025-04-05

## 2025-04-05 RX ORDER — ACETAMINOPHEN 325 MG/1
650 TABLET ORAL ONCE
Status: COMPLETED | OUTPATIENT
Start: 2025-04-05 | End: 2025-04-05

## 2025-04-05 RX ORDER — DEXAMETHASONE 4 MG/1
8 TABLET ORAL ONCE
Status: COMPLETED | OUTPATIENT
Start: 2025-04-05 | End: 2025-04-05

## 2025-04-05 RX ADMIN — PROCHLORPERAZINE EDISYLATE 10 MG: 5 INJECTION INTRAMUSCULAR; INTRAVENOUS at 06:39

## 2025-04-05 RX ADMIN — DEXAMETHASONE 8 MG: 4 TABLET ORAL at 06:38

## 2025-04-05 RX ADMIN — IBUPROFEN 600 MG: 600 TABLET, FILM COATED ORAL at 04:05

## 2025-04-05 RX ADMIN — SODIUM CHLORIDE 1000 ML: 0.9 INJECTION, SOLUTION INTRAVENOUS at 06:37

## 2025-04-05 RX ADMIN — ACETAMINOPHEN 650 MG: 325 TABLET ORAL at 06:38

## 2025-04-05 ASSESSMENT — PAIN SCALES - GENERAL
PAINLEVEL_OUTOF10: 0 - NO PAIN
PAINLEVEL_OUTOF10: 8
PAINLEVEL_OUTOF10: 6

## 2025-04-05 ASSESSMENT — PAIN DESCRIPTION - DESCRIPTORS
DESCRIPTORS: TENDER
DESCRIPTORS: TIGHTNESS

## 2025-04-05 ASSESSMENT — PAIN - FUNCTIONAL ASSESSMENT
PAIN_FUNCTIONAL_ASSESSMENT: 0-10
PAIN_FUNCTIONAL_ASSESSMENT: 0-10

## 2025-04-05 ASSESSMENT — PAIN DESCRIPTION - LOCATION: LOCATION: NECK

## 2025-04-05 ASSESSMENT — LIFESTYLE VARIABLES
EVER HAD A DRINK FIRST THING IN THE MORNING TO STEADY YOUR NERVES TO GET RID OF A HANGOVER: NO
HAVE PEOPLE ANNOYED YOU BY CRITICIZING YOUR DRINKING: NO
TOTAL SCORE: 0
HAVE YOU EVER FELT YOU SHOULD CUT DOWN ON YOUR DRINKING: NO
EVER FELT BAD OR GUILTY ABOUT YOUR DRINKING: NO

## 2025-04-05 ASSESSMENT — PAIN DESCRIPTION - PAIN TYPE: TYPE: ACUTE PAIN

## 2025-04-05 NOTE — ED PROVIDER NOTES
HPI   Chief Complaint   Patient presents with    Chest Pain    Neck Pain       Patient presents emergency department for vomiting, diarrhea, headache and neck stiffness.  Patient states he ate out last night and was awoken with abdominal pain causing diarrhea and vomiting.  Patient she felt initially better after vomiting but then had some neck stiffness followed by headache.  She also endorsed some chest pain and the numbness in her left arm.  This made her concerned prompting her to call EMS.  EMS brought the patient here for further evaluation.  Patient states she is updated on her immunizations and believes she got meningitis shots.  Patient has some concern for meningitis as her family history of meningitis.  Patient states this headache is 8 out of 10 about the worst headache of her life.  Patient does have a history of headaches but this is different than her prior headaches.              Patient History   No past medical history on file.  No past surgical history on file.  No family history on file.  Social History     Tobacco Use    Smoking status: Not on file    Smokeless tobacco: Not on file   Substance Use Topics    Alcohol use: Not on file    Drug use: Not on file       Physical Exam   ED Triage Vitals [04/05/25 0255]   Temperature Heart Rate Respirations BP   36.4 °C (97.5 °F) 89 18 107/82      Pulse Ox Temp Source Heart Rate Source Patient Position   100 % Temporal -- Lying      BP Location FiO2 (%)     Left arm --       Physical Exam  Vitals and nursing note reviewed.   Constitutional:       General: She is not in acute distress.     Appearance: She is well-developed.   HENT:      Head: Normocephalic and atraumatic.      Right Ear: External ear normal.      Left Ear: External ear normal.      Nose: Nose normal.      Mouth/Throat:      Mouth: Mucous membranes are moist.      Pharynx: Oropharynx is clear.   Eyes:      Extraocular Movements: Extraocular movements intact.      Conjunctiva/sclera:  Conjunctivae normal.   Neck:      Comments: Trachea midline  Cardiovascular:      Rate and Rhythm: Normal rate.      Pulses: Normal pulses.   Pulmonary:      Effort: Pulmonary effort is normal. No respiratory distress.      Breath sounds: Normal breath sounds.   Abdominal:      General: Bowel sounds are normal.      Palpations: Abdomen is soft.      Tenderness: There is no abdominal tenderness.   Musculoskeletal:         General: No swelling or tenderness.      Cervical back: No tenderness.   Skin:     General: Skin is warm and dry.      Capillary Refill: Capillary refill takes less than 2 seconds.   Neurological:      General: No focal deficit present.      Mental Status: She is alert and oriented to person, place, and time.   Psychiatric:         Mood and Affect: Mood normal.         Thought Content: Thought content does not include homicidal or suicidal ideation.           ED Course & MDM                  No data recorded     [place] Coma Scale Score: 15 (04/05/25 0259 : Diann Washington RN)                           Medical Decision Making  Patient presents with nausea, vomiting, diarrhea headache and neck stiffness. Available chart reviewed. On initial assessment the patient was found non-toxic, no acute distress, vitals hemodynamically stable and afebrile. Initial concern for meningitis, MI, ACS, pneumonia, pneumothorax, intracranial hemorrhage, headache disorder.  Chest x-ray is read as negative.  Troponins undetected.  CBC and CMP are normal.  Patient requesting CT of the head be obtained.  I have low concern for intracranial hemorrhage or meningitis since patient is afebrile.  Will give headache cocktail and reassess.  Patient care signed out to oncoming team pending CT head and reassessment.    Amount and/or Complexity of Data Reviewed  ECG/medicine tests: independent interpretation performed.     Details: EKG obtained at 303 interpreted by myself shows sinus rhythm, rate 75,  QRS is 82 QTc is 443, no  ST segment changes, no T wave changes        Procedure  Procedures     Lonnie Malik MD  04/05/25 0563

## 2025-04-05 NOTE — PROGRESS NOTES
Emergency Medicine Transition of Care Note.    I received Jennifer Connors in signout from Dr. Malik.  Please see the previous ED provider note for all HPI, PE and MDM up to the time of signout at 7 AM. This is in addition to the primary record.    In brief Jennifer Connors is an 29 y.o. female presenting for   Chief Complaint   Patient presents with    Chest Pain    Neck Pain     At the time of signout we were awaiting: CT head         Medical Decision Making    CT head is negative, on my assessment the patient feeling comfortable afebrile normotensive neurologic intact wants to go home imaging and lab results discussed in detail will be discharged with supportive care and outpatient follow-up recommended with primary care doctor with strict return precaution.  Final diagnoses:   None           Procedure  Procedures    Andres Cardoza MD

## 2025-04-07 LAB
ATRIAL RATE: 74 BPM
P AXIS: -26 DEGREES
PR INTERVAL: 197 MS
Q ONSET: 249 MS
QRS COUNT: 12 BEATS
QRS DURATION: 82 MS
QT INTERVAL: 396 MS
QTC CALCULATION(BAZETT): 443 MS
QTC FREDERICIA: 426 MS
R AXIS: 95 DEGREES
T AXIS: 23 DEGREES
T OFFSET: 447 MS
VENTRICULAR RATE: 75 BPM

## 2025-04-11 ENCOUNTER — APPOINTMENT (OUTPATIENT)
Dept: OBSTETRICS AND GYNECOLOGY | Facility: CLINIC | Age: 30
End: 2025-04-11
Payer: COMMERCIAL

## 2025-05-01 ENCOUNTER — TELEPHONE (OUTPATIENT)
Dept: OBSTETRICS AND GYNECOLOGY | Facility: CLINIC | Age: 30
End: 2025-05-01

## 2025-05-01 ENCOUNTER — APPOINTMENT (OUTPATIENT)
Dept: OBSTETRICS AND GYNECOLOGY | Facility: CLINIC | Age: 30
End: 2025-05-01
Payer: COMMERCIAL

## 2025-05-01 DIAGNOSIS — Z30.432 ENCOUNTER FOR IUD REMOVAL: Primary | ICD-10-CM

## 2025-05-01 PROBLEM — T83.32XA IUD STRINGS LOST: Status: ACTIVE | Noted: 2025-05-01

## 2025-05-01 RX ORDER — DIAZEPAM 5 MG/1
5 TABLET ORAL ONCE
Qty: 2 TABLET | Refills: 0 | Status: SHIPPED | OUTPATIENT
Start: 2025-05-01 | End: 2025-05-01

## 2025-05-01 NOTE — TELEPHONE ENCOUNTER
Patient scheduled for endosee/IUD removal 5/20/25 with [facility]  Would like to be pre medicated   [facility]  Patient informed she will need a  if being pre medicated  No medication allergies.. Latex allergy verified with patient

## 2025-05-01 NOTE — PROGRESS NOTES
Subjective   Patient ID: Kristina Manning is a 30 y.o. female who presents for No chief complaint on file..  She presents today for IUD removal. She has a ParaGard IUD since 2021. This was attempted to be removed in the office by Diane Yi on 2025 and in 2024 but this was unsuccessful due to inability to locate the strings. The IUD was visualized on ultrasound in 2024.     She desires removal using the hysteroscope. Valium was prescribed to take prior to visit.         Review of Systems    Medical History[1]   Surgical History[2]   RX Allergies[3]   Medications Ordered Prior to Encounter[4]     Objective   Physical Exam      Problem List Items Addressed This Visit          Medium    Encounter for IUD removal - Primary    Overview   ParaGard IUD with strings not noted on exam. She desires removal and has noted some cramping with menses.          IUD strings lost    Overview   ParaGard IUD is in place, confirmed on ultrasound on 2024.  2025 office removal was unsuccessful.                         [1]  Past Medical History:  Diagnosis Date   • Acute pharyngitis, unspecified 2017    Acute viral pharyngitis   • Encounter for  screening, unspecified 2021     screening encounter   • Encounter for removal of intrauterine contraceptive device 2020    Encounter for removal of intrauterine contraceptive device (IUD)   • Encounter for supervision of other normal pregnancy, first trimester 2020    Multigravida in first trimester   • Encounter for supervision of other normal pregnancy, second trimester 2020    Multigravida in second trimester   • Encounter for supervision of other normal pregnancy, third trimester 2020    Multigravida in third trimester   • Maternal care for other known or suspected poor fetal growth, unspecified trimester, fetus 1 2021    SGA (small for gestational age), fetal, affecting care of mother, antepartum,  unspecified trimester, fetus 1   • Migraine without aura, not intractable, without status migrainosus 2017    Common migraine without aura   • Other conditions influencing health status 2022    History of cough   • Other infections with a predominantly sexual mode of transmission complicating pregnancy, unspecified trimester ([facility]) 2020    Genital herpes complicating pregnancy   • Other specified pregnancy related conditions, second trimester ([facility]) 2020    Back pain during pregnancy in second trimester   • Other specified pregnancy related conditions, unspecified trimester ([facility]) 2020    Pelvic pain in antepartum period   • Other specified pregnancy related conditions, unspecified trimester ([facility]) 2021    Rh negative, antepartum   • Other viral diseases complicating pregnancy, unspecified trimester ([facility]) 2021    COVID-19 affecting pregnancy, antepartum   • Personal history of other diseases of the female genital tract 2020    History of amenorrhea   • Personal history of other diseases of the musculoskeletal system and connective tissue     History of fibromyalgia   • Personal history of other diseases of the respiratory system     History of asthma   • Pregnancy with inconclusive fetal viability, not applicable or unspecified 2020    Pregnancy with inconclusive fetal viability   • Supervision of pregnancy with other poor reproductive or obstetric history, first trimester ([facility]) 10/01/2020    Current pregnancy in first trimester with history of spontaneous  during prior pregnancy   • Supervision of pregnancy with other poor reproductive or obstetric history, second trimester ([facility]) 2021    Current pregnancy in second trimester with history of spontaneous  during prior pregnancy   • Supervision of pregnancy with other poor reproductive or obstetric history, third trimester 2021    Current pregnancy in third  trimester with history of spontaneous  during prior pregnancy   • Unspecified convulsions (Multi) 2017    Generalized convulsive seizure   [2]  Past Surgical History:  Procedure Laterality Date   • NO PAST SURGERIES     [3]  Allergies  Allergen Reactions   • Latex Hives   [4]  Current Outpatient Medications on File Prior to Visit   Medication Sig Dispense Refill   • copper (ParaGard T 380A) 380 square mm IUD 1 each by intrauterine route 1 time. Inserted 2021     • diazePAM (Valium) 5 mg tablet Take 1 tablet (5 mg) by mouth 1 time for 1 dose. Take one hour prior to scheduled procedure. A second dose may be taken after 30 minutes if needed. 2 tablet 0     No current facility-administered medications on file prior to visit.    other poor reproductive or obstetric history, second trimester ([facility]) 2021    Current pregnancy in second trimester with history of spontaneous  during prior pregnancy    Supervision of pregnancy with other poor reproductive or obstetric history, third trimester 2021    Current pregnancy in third trimester with history of spontaneous  during prior pregnancy    Unspecified convulsions (Multi) 2017    Generalized convulsive seizure   [2]   Past Surgical History:  Procedure Laterality Date    NO PAST SURGERIES     [3]   Allergies  Allergen Reactions    Latex Hives   [4]   Current Outpatient Medications on File Prior to Visit   Medication Sig Dispense Refill    copper (ParaGard T 380A) 380 square mm IUD 1 each by intrauterine route 1 time. Inserted 2021      diazePAM (Valium) 5 mg tablet Take 1 tablet (5 mg) by mouth 1 time for 1 dose. Take one hour prior to scheduled procedure. A second dose may be taken after 30 minutes if needed. 2 tablet 0     No current facility-administered medications on file prior to visit.

## 2025-05-02 ENCOUNTER — APPOINTMENT (OUTPATIENT)
Dept: OBSTETRICS AND GYNECOLOGY | Facility: CLINIC | Age: 30
End: 2025-05-02
Payer: COMMERCIAL

## 2025-05-20 ENCOUNTER — APPOINTMENT (OUTPATIENT)
Dept: OBSTETRICS AND GYNECOLOGY | Facility: CLINIC | Age: 30
End: 2025-05-20
Payer: COMMERCIAL

## 2025-05-20 VITALS — DIASTOLIC BLOOD PRESSURE: 60 MMHG | SYSTOLIC BLOOD PRESSURE: 98 MMHG | WEIGHT: 122 LBS | BODY MASS INDEX: 21.61 KG/M2

## 2025-05-20 DIAGNOSIS — Z30.432 ENCOUNTER FOR IUD REMOVAL: Primary | ICD-10-CM

## 2025-05-20 DIAGNOSIS — T83.32XD INTRAUTERINE CONTRACEPTIVE DEVICE THREADS LOST, SUBSEQUENT ENCOUNTER: ICD-10-CM

## 2025-05-20 RX ORDER — KETOROLAC TROMETHAMINE 30 MG/ML
30 INJECTION, SOLUTION INTRAMUSCULAR; INTRAVENOUS ONCE
Status: COMPLETED | OUTPATIENT
Start: 2025-05-20 | End: 2025-05-20

## 2025-05-20 RX ADMIN — KETOROLAC TROMETHAMINE 30 MG: 30 INJECTION, SOLUTION INTRAMUSCULAR; INTRAVENOUS at 07:20

## 2025-05-20 ASSESSMENT — ENCOUNTER SYMPTOMS
ACTIVITY CHANGE: 0
COUGH: 0
APNEA: 0
DIARRHEA: 0
CONSTIPATION: 0
AGITATION: 0
HEMATURIA: 0
DIZZINESS: 0
JOINT SWELLING: 0